# Patient Record
Sex: FEMALE | Race: WHITE | Employment: OTHER | URBAN - METROPOLITAN AREA
[De-identification: names, ages, dates, MRNs, and addresses within clinical notes are randomized per-mention and may not be internally consistent; named-entity substitution may affect disease eponyms.]

---

## 2017-01-01 ENCOUNTER — APPOINTMENT (OUTPATIENT)
Dept: GENERAL RADIOLOGY | Facility: HOSPITAL | Age: 82
End: 2017-01-01
Attending: EMERGENCY MEDICINE
Payer: MEDICARE

## 2017-01-01 ENCOUNTER — HOSPITAL ENCOUNTER (EMERGENCY)
Facility: HOSPITAL | Age: 82
Discharge: HOME | End: 2017-01-01
Attending: EMERGENCY MEDICINE
Payer: MEDICARE

## 2017-01-01 ENCOUNTER — APPOINTMENT (OUTPATIENT)
Dept: GENERAL RADIOLOGY | Facility: HOSPITAL | Age: 82
DRG: 280 | End: 2017-01-01
Attending: EMERGENCY MEDICINE
Payer: MEDICARE

## 2017-01-01 ENCOUNTER — APPOINTMENT (OUTPATIENT)
Dept: NUCLEAR MEDICINE | Facility: HOSPITAL | Age: 82
End: 2017-01-01
Attending: EMERGENCY MEDICINE
Payer: MEDICARE

## 2017-01-01 ENCOUNTER — HOSPITAL ENCOUNTER (INPATIENT)
Facility: HOSPITAL | Age: 82
LOS: 6 days | Discharge: HOME HEALTH | DRG: 291 | End: 2017-01-01
Attending: EMERGENCY MEDICINE | Admitting: HOSPITALIST
Payer: MEDICARE

## 2017-01-01 ENCOUNTER — HOSPITAL ENCOUNTER (INPATIENT)
Facility: HOSPITAL | Age: 82
LOS: 1 days | DRG: 283 | End: 2017-01-01
Attending: HOSPITALIST | Admitting: HOSPITALIST
Payer: OTHER MISCELLANEOUS

## 2017-01-01 ENCOUNTER — APPOINTMENT (OUTPATIENT)
Dept: CT IMAGING | Facility: HOSPITAL | Age: 82
DRG: 683 | End: 2017-01-01
Attending: HOSPITALIST
Payer: MEDICARE

## 2017-01-01 ENCOUNTER — APPOINTMENT (OUTPATIENT)
Dept: GENERAL RADIOLOGY | Facility: HOSPITAL | Age: 82
DRG: 280 | End: 2017-01-01
Attending: INTERNAL MEDICINE
Payer: MEDICARE

## 2017-01-01 ENCOUNTER — HOSPITAL ENCOUNTER (INPATIENT)
Facility: HOSPITAL | Age: 82
LOS: 4 days | Discharge: HOME HEALTH | DRG: 683 | End: 2017-01-01
Attending: EMERGENCY MEDICINE | Admitting: INTERNAL MEDICINE
Payer: MEDICARE

## 2017-01-01 ENCOUNTER — APPOINTMENT (OUTPATIENT)
Dept: GENERAL RADIOLOGY | Facility: HOSPITAL | Age: 82
DRG: 291 | End: 2017-01-01
Attending: EMERGENCY MEDICINE
Payer: MEDICARE

## 2017-01-01 ENCOUNTER — APPOINTMENT (OUTPATIENT)
Dept: CT IMAGING | Facility: HOSPITAL | Age: 82
DRG: 280 | End: 2017-01-01
Attending: INTERNAL MEDICINE
Payer: MEDICARE

## 2017-01-01 ENCOUNTER — HOSPITAL ENCOUNTER (INPATIENT)
Facility: HOSPITAL | Age: 82
LOS: 13 days | Discharge: HOSPICE | DRG: 280 | End: 2017-01-01
Attending: EMERGENCY MEDICINE | Admitting: HOSPITALIST
Payer: MEDICARE

## 2017-01-01 ENCOUNTER — APPOINTMENT (OUTPATIENT)
Dept: GENERAL RADIOLOGY | Facility: HOSPITAL | Age: 82
DRG: 291 | End: 2017-01-01
Attending: INTERNAL MEDICINE
Payer: MEDICARE

## 2017-01-01 ENCOUNTER — APPOINTMENT (OUTPATIENT)
Dept: GENERAL RADIOLOGY | Facility: HOSPITAL | Age: 82
DRG: 291 | End: 2017-01-01
Attending: RADIOLOGY
Payer: MEDICARE

## 2017-01-01 ENCOUNTER — APPOINTMENT (OUTPATIENT)
Dept: GENERAL RADIOLOGY | Facility: HOSPITAL | Age: 82
DRG: 683 | End: 2017-01-01
Attending: EMERGENCY MEDICINE
Payer: MEDICARE

## 2017-01-01 ENCOUNTER — APPOINTMENT (OUTPATIENT)
Dept: ULTRASOUND IMAGING | Facility: HOSPITAL | Age: 82
DRG: 291 | End: 2017-01-01
Attending: INTERNAL MEDICINE
Payer: MEDICARE

## 2017-01-01 VITALS
TEMPERATURE: 98 F | HEART RATE: 47 BPM | DIASTOLIC BLOOD PRESSURE: 34 MMHG | SYSTOLIC BLOOD PRESSURE: 98 MMHG | WEIGHT: 110.88 LBS | RESPIRATION RATE: 18 BRPM | OXYGEN SATURATION: 100 % | BODY MASS INDEX: 22 KG/M2

## 2017-01-01 VITALS
WEIGHT: 108 LBS | OXYGEN SATURATION: 97 % | HEART RATE: 32 BPM | BODY MASS INDEX: 21.2 KG/M2 | HEIGHT: 60 IN | RESPIRATION RATE: 18 BRPM | TEMPERATURE: 98 F | SYSTOLIC BLOOD PRESSURE: 106 MMHG | DIASTOLIC BLOOD PRESSURE: 47 MMHG

## 2017-01-01 VITALS
BODY MASS INDEX: 21.6 KG/M2 | HEIGHT: 60 IN | HEART RATE: 44 BPM | WEIGHT: 110 LBS | SYSTOLIC BLOOD PRESSURE: 115 MMHG | TEMPERATURE: 98 F | DIASTOLIC BLOOD PRESSURE: 44 MMHG | RESPIRATION RATE: 23 BRPM | OXYGEN SATURATION: 99 %

## 2017-01-01 VITALS
HEART RATE: 99 BPM | BODY MASS INDEX: 23.94 KG/M2 | RESPIRATION RATE: 18 BRPM | OXYGEN SATURATION: 99 % | HEIGHT: 60 IN | WEIGHT: 121.94 LBS | DIASTOLIC BLOOD PRESSURE: 61 MMHG | TEMPERATURE: 98 F | SYSTOLIC BLOOD PRESSURE: 142 MMHG

## 2017-01-01 VITALS
TEMPERATURE: 97 F | SYSTOLIC BLOOD PRESSURE: 126 MMHG | RESPIRATION RATE: 20 BRPM | HEART RATE: 35 BPM | DIASTOLIC BLOOD PRESSURE: 59 MMHG

## 2017-01-01 DIAGNOSIS — R06.00 DYSPNEA, UNSPECIFIED TYPE: Primary | ICD-10-CM

## 2017-01-01 DIAGNOSIS — N19 RENAL FAILURE: ICD-10-CM

## 2017-01-01 DIAGNOSIS — M54.12 CERVICAL RADICULOPATHY: Primary | ICD-10-CM

## 2017-01-01 DIAGNOSIS — R06.02 SHORTNESS OF BREATH: ICD-10-CM

## 2017-01-01 DIAGNOSIS — R77.8 ELEVATED TROPONIN: ICD-10-CM

## 2017-01-01 DIAGNOSIS — E87.1 HYPONATREMIA: ICD-10-CM

## 2017-01-01 DIAGNOSIS — R00.2 PALPITATIONS: Primary | ICD-10-CM

## 2017-01-01 DIAGNOSIS — R07.9 ACUTE CHEST PAIN: Primary | ICD-10-CM

## 2017-01-01 DIAGNOSIS — J90 BILATERAL PLEURAL EFFUSION: ICD-10-CM

## 2017-01-01 LAB
ALBUMIN SERPL-MCNC: 2.7 G/DL (ref 3.5–4.8)
ALBUMIN SERPL-MCNC: 2.7 G/DL (ref 3.5–4.8)
ALBUMIN SERPL-MCNC: 3 G/DL (ref 3.5–4.8)
ALBUMIN SERPL-MCNC: 3.4 G/DL (ref 3.5–4.8)
ALP LIVER SERPL-CCNC: 133 U/L (ref 55–142)
ALP LIVER SERPL-CCNC: 138 U/L (ref 55–142)
ALP LIVER SERPL-CCNC: 155 U/L (ref 55–142)
ALP LIVER SERPL-CCNC: 163 U/L (ref 55–142)
ALT SERPL-CCNC: 18 U/L (ref 14–54)
ALT SERPL-CCNC: 24 U/L (ref 14–54)
ALT SERPL-CCNC: 26 U/L (ref 14–54)
ALT SERPL-CCNC: 28 U/L (ref 14–54)
APTT PPP: 136.2 SECONDS (ref 25–34)
APTT PPP: 28 SECONDS (ref 25–34)
APTT PPP: 33.3 SECONDS (ref 25–34)
APTT PPP: 34.7 SECONDS (ref 25–34)
APTT PPP: 42.8 SECONDS (ref 25–34)
APTT PPP: 68.8 SECONDS (ref 25–34)
APTT PPP: 69 SECONDS (ref 25–34)
APTT PPP: 69.4 SECONDS (ref 25–34)
AST SERPL-CCNC: 25 U/L (ref 15–41)
AST SERPL-CCNC: 26 U/L (ref 15–41)
AST SERPL-CCNC: 30 U/L (ref 15–41)
AST SERPL-CCNC: 32 U/L (ref 15–41)
ATRIAL RATE: 300 BPM
ATRIAL RATE: 32 BPM
ATRIAL RATE: 42 BPM
ATRIAL RATE: 47 BPM
ATRIAL RATE: 56 BPM
ATRIAL RATE: 59 BPM
ATRIAL RATE: 71 BPM
ATRIAL RATE: 88 BPM
BAND %: 2 %
BASOPHIL % MANUAL: 0 %
BASOPHIL % MANUAL: 0 %
BASOPHIL ABSOLUTE MANUAL: 0 X10(3) UL (ref 0–0.1)
BASOPHIL ABSOLUTE MANUAL: 0 X10(3) UL (ref 0–0.1)
BASOPHILS # BLD AUTO: 0.04 X10(3) UL (ref 0–0.1)
BASOPHILS # BLD AUTO: 0.05 X10(3) UL (ref 0–0.1)
BASOPHILS # BLD AUTO: 0.06 X10(3) UL (ref 0–0.1)
BASOPHILS # BLD AUTO: 0.06 X10(3) UL (ref 0–0.1)
BASOPHILS # BLD AUTO: 0.07 X10(3) UL (ref 0–0.1)
BASOPHILS # BLD AUTO: 0.08 X10(3) UL (ref 0–0.1)
BASOPHILS # BLD AUTO: 0.08 X10(3) UL (ref 0–0.1)
BASOPHILS # BLD AUTO: 0.11 X10(3) UL (ref 0–0.1)
BASOPHILS NFR BLD AUTO: 0.4 %
BASOPHILS NFR BLD AUTO: 0.4 %
BASOPHILS NFR BLD AUTO: 0.5 %
BASOPHILS NFR BLD AUTO: 0.5 %
BASOPHILS NFR BLD AUTO: 0.6 %
BASOPHILS NFR BLD AUTO: 0.7 %
BASOPHILS NFR BLD AUTO: 0.7 %
BASOPHILS NFR BLD AUTO: 1.1 %
BILIRUB SERPL-MCNC: 0.2 MG/DL (ref 0.1–2)
BILIRUB SERPL-MCNC: 0.3 MG/DL (ref 0.1–2)
BILIRUB UR QL STRIP.AUTO: NEGATIVE
BILIRUB UR QL STRIP.AUTO: NEGATIVE
BUN BLD-MCNC: 101 MG/DL (ref 8–20)
BUN BLD-MCNC: 24 MG/DL (ref 8–20)
BUN BLD-MCNC: 26 MG/DL (ref 8–20)
BUN BLD-MCNC: 27 MG/DL (ref 8–20)
BUN BLD-MCNC: 34 MG/DL (ref 8–20)
BUN BLD-MCNC: 37 MG/DL (ref 8–20)
BUN BLD-MCNC: 42 MG/DL (ref 8–20)
BUN BLD-MCNC: 46 MG/DL (ref 8–20)
BUN BLD-MCNC: 46 MG/DL (ref 8–20)
BUN BLD-MCNC: 48 MG/DL (ref 8–20)
BUN BLD-MCNC: 50 MG/DL (ref 8–20)
BUN BLD-MCNC: 50 MG/DL (ref 8–20)
BUN BLD-MCNC: 51 MG/DL (ref 8–20)
BUN BLD-MCNC: 51 MG/DL (ref 8–20)
BUN BLD-MCNC: 52 MG/DL (ref 8–20)
BUN BLD-MCNC: 57 MG/DL (ref 8–20)
BUN BLD-MCNC: 58 MG/DL (ref 8–20)
BUN BLD-MCNC: 63 MG/DL (ref 8–20)
BUN BLD-MCNC: 77 MG/DL (ref 8–20)
BUN BLD-MCNC: 81 MG/DL (ref 8–20)
BUN BLD-MCNC: 89 MG/DL (ref 8–20)
BUN BLD-MCNC: 96 MG/DL (ref 8–20)
CALCIUM BLD-MCNC: 8 MG/DL (ref 8.3–10.3)
CALCIUM BLD-MCNC: 8.1 MG/DL (ref 8.3–10.3)
CALCIUM BLD-MCNC: 8.2 MG/DL (ref 8.3–10.3)
CALCIUM BLD-MCNC: 8.4 MG/DL (ref 8.3–10.3)
CALCIUM BLD-MCNC: 8.5 MG/DL (ref 8.3–10.3)
CALCIUM BLD-MCNC: 8.6 MG/DL (ref 8.3–10.3)
CALCIUM BLD-MCNC: 8.6 MG/DL (ref 8.3–10.3)
CALCIUM BLD-MCNC: 8.7 MG/DL (ref 8.3–10.3)
CALCIUM BLD-MCNC: 8.8 MG/DL (ref 8.3–10.3)
CALCIUM BLD-MCNC: 8.9 MG/DL (ref 8.3–10.3)
CALCIUM BLD-MCNC: 8.9 MG/DL (ref 8.3–10.3)
CALCIUM BLD-MCNC: 9 MG/DL (ref 8.3–10.3)
CALCIUM BLD-MCNC: 9.1 MG/DL (ref 8.3–10.3)
CALCIUM BLD-MCNC: 9.1 MG/DL (ref 8.3–10.3)
CALCIUM BLD-MCNC: 9.3 MG/DL (ref 8.3–10.3)
CALCIUM BLD-MCNC: 9.3 MG/DL (ref 8.3–10.3)
CALCIUM BLD-MCNC: 9.8 MG/DL (ref 8.3–10.3)
CHLORIDE: 100 MMOL/L (ref 101–111)
CHLORIDE: 100 MMOL/L (ref 101–111)
CHLORIDE: 101 MMOL/L (ref 101–111)
CHLORIDE: 101 MMOL/L (ref 101–111)
CHLORIDE: 102 MMOL/L (ref 101–111)
CHLORIDE: 109 MMOL/L (ref 101–111)
CHLORIDE: 113 MMOL/L (ref 101–111)
CHLORIDE: 86 MMOL/L (ref 101–111)
CHLORIDE: 87 MMOL/L (ref 101–111)
CHLORIDE: 88 MMOL/L (ref 101–111)
CHLORIDE: 89 MMOL/L (ref 101–111)
CHLORIDE: 91 MMOL/L (ref 101–111)
CHLORIDE: 93 MMOL/L (ref 101–111)
CHLORIDE: 94 MMOL/L (ref 101–111)
CHLORIDE: 95 MMOL/L (ref 101–111)
CHLORIDE: 95 MMOL/L (ref 101–111)
CHLORIDE: 96 MMOL/L (ref 101–111)
CHLORIDE: 98 MMOL/L (ref 101–111)
CHLORIDE: 98 MMOL/L (ref 101–111)
CHLORIDE: 99 MMOL/L (ref 101–111)
CK: 36 IU/L (ref 26–192)
CK: 37 IU/L (ref 26–192)
CK: 39 IU/L (ref 26–192)
CLARITY UR REFRACT.AUTO: CLEAR
CO2: 18 MMOL/L (ref 22–32)
CO2: 19 MMOL/L (ref 22–32)
CO2: 19 MMOL/L (ref 22–32)
CO2: 20 MMOL/L (ref 22–32)
CO2: 23 MMOL/L (ref 22–32)
CO2: 25 MMOL/L (ref 22–32)
CO2: 25 MMOL/L (ref 22–32)
CO2: 26 MMOL/L (ref 22–32)
CO2: 27 MMOL/L (ref 22–32)
CO2: 28 MMOL/L (ref 22–32)
CO2: 29 MMOL/L (ref 22–32)
CO2: 30 MMOL/L (ref 22–32)
CO2: 30 MMOL/L (ref 22–32)
CO2: 31 MMOL/L (ref 22–32)
COLOR UR AUTO: YELLOW
CREAT BLD-MCNC: 1.27 MG/DL (ref 0.55–1.02)
CREAT BLD-MCNC: 1.41 MG/DL (ref 0.55–1.02)
CREAT BLD-MCNC: 1.41 MG/DL (ref 0.55–1.02)
CREAT BLD-MCNC: 1.45 MG/DL (ref 0.55–1.02)
CREAT BLD-MCNC: 1.45 MG/DL (ref 0.55–1.02)
CREAT BLD-MCNC: 1.46 MG/DL (ref 0.55–1.02)
CREAT BLD-MCNC: 1.48 MG/DL (ref 0.55–1.02)
CREAT BLD-MCNC: 1.53 MG/DL (ref 0.55–1.02)
CREAT BLD-MCNC: 1.54 MG/DL (ref 0.55–1.02)
CREAT BLD-MCNC: 1.57 MG/DL (ref 0.55–1.02)
CREAT BLD-MCNC: 1.65 MG/DL (ref 0.55–1.02)
CREAT BLD-MCNC: 1.72 MG/DL (ref 0.55–1.02)
CREAT BLD-MCNC: 1.79 MG/DL (ref 0.55–1.02)
CREAT BLD-MCNC: 1.93 MG/DL (ref 0.55–1.02)
CREAT BLD-MCNC: 2.91 MG/DL (ref 0.55–1.02)
CREAT BLD-MCNC: 3.09 MG/DL (ref 0.55–1.02)
CREAT BLD-MCNC: 3.13 MG/DL (ref 0.55–1.02)
CREAT BLD-MCNC: 3.36 MG/DL (ref 0.55–1.02)
CREAT BLD-MCNC: 4.55 MG/DL (ref 0.55–1.02)
CREAT BLD-MCNC: 5.15 MG/DL (ref 0.55–1.02)
CREAT UR-SCNC: 61.4 MG/DL
CREAT UR-SCNC: 74.6 MG/DL
DEPRECATED HBV CORE AB SER IA-ACNC: 566.7 NG/ML (ref 10–291)
EOSINOPHIL # BLD AUTO: 0.1 X10(3) UL (ref 0–0.3)
EOSINOPHIL # BLD AUTO: 0.15 X10(3) UL (ref 0–0.3)
EOSINOPHIL # BLD AUTO: 0.2 X10(3) UL (ref 0–0.3)
EOSINOPHIL # BLD AUTO: 0.23 X10(3) UL (ref 0–0.3)
EOSINOPHIL # BLD AUTO: 0.23 X10(3) UL (ref 0–0.3)
EOSINOPHIL # BLD AUTO: 0.28 X10(3) UL (ref 0–0.3)
EOSINOPHIL # BLD AUTO: 0.28 X10(3) UL (ref 0–0.3)
EOSINOPHIL # BLD AUTO: 0.31 X10(3) UL (ref 0–0.3)
EOSINOPHIL # BLD AUTO: 0.31 X10(3) UL (ref 0–0.3)
EOSINOPHIL # BLD AUTO: 0.33 X10(3) UL (ref 0–0.3)
EOSINOPHIL # BLD AUTO: 0.35 X10(3) UL (ref 0–0.3)
EOSINOPHIL % MANUAL: 1 %
EOSINOPHIL % MANUAL: 1 %
EOSINOPHIL ABSOLUTE MANUAL: 0.14 X10(3) UL (ref 0–0.3)
EOSINOPHIL ABSOLUTE MANUAL: 0.19 X10(3) UL (ref 0–0.3)
EOSINOPHIL NFR BLD AUTO: 1 %
EOSINOPHIL NFR BLD AUTO: 1.4 %
EOSINOPHIL NFR BLD AUTO: 1.8 %
EOSINOPHIL NFR BLD AUTO: 2.1 %
EOSINOPHIL NFR BLD AUTO: 2.3 %
EOSINOPHIL NFR BLD AUTO: 2.3 %
EOSINOPHIL NFR BLD AUTO: 2.4 %
EOSINOPHIL NFR BLD AUTO: 2.5 %
EOSINOPHIL NFR BLD AUTO: 2.5 %
EOSINOPHIL NFR BLD AUTO: 2.8 %
EOSINOPHIL NFR BLD AUTO: 3.1 %
ERYTHROCYTE [DISTWIDTH] IN BLOOD BY AUTOMATED COUNT: 12.9 % (ref 11.5–16)
ERYTHROCYTE [DISTWIDTH] IN BLOOD BY AUTOMATED COUNT: 13 % (ref 11.5–16)
ERYTHROCYTE [DISTWIDTH] IN BLOOD BY AUTOMATED COUNT: 13 % (ref 11.5–16)
ERYTHROCYTE [DISTWIDTH] IN BLOOD BY AUTOMATED COUNT: 13.1 % (ref 11.5–16)
ERYTHROCYTE [DISTWIDTH] IN BLOOD BY AUTOMATED COUNT: 13.2 % (ref 11.5–16)
ERYTHROCYTE [DISTWIDTH] IN BLOOD BY AUTOMATED COUNT: 13.4 % (ref 11.5–16)
ERYTHROCYTE [DISTWIDTH] IN BLOOD BY AUTOMATED COUNT: 13.5 % (ref 11.5–16)
ERYTHROCYTE [DISTWIDTH] IN BLOOD BY AUTOMATED COUNT: 13.8 % (ref 11.5–16)
ERYTHROCYTE [DISTWIDTH] IN BLOOD BY AUTOMATED COUNT: 13.8 % (ref 11.5–16)
ERYTHROCYTE [DISTWIDTH] IN BLOOD BY AUTOMATED COUNT: 13.9 % (ref 11.5–16)
ERYTHROCYTE [DISTWIDTH] IN BLOOD BY AUTOMATED COUNT: 13.9 % (ref 11.5–16)
ERYTHROCYTE [DISTWIDTH] IN BLOOD BY AUTOMATED COUNT: 14.1 % (ref 11.5–16)
ERYTHROCYTE [DISTWIDTH] IN BLOOD BY AUTOMATED COUNT: 14.3 % (ref 11.5–16)
GLUCOSE BLD-MCNC: 100 MG/DL (ref 70–99)
GLUCOSE BLD-MCNC: 103 MG/DL (ref 70–99)
GLUCOSE BLD-MCNC: 105 MG/DL (ref 70–99)
GLUCOSE BLD-MCNC: 108 MG/DL (ref 70–99)
GLUCOSE BLD-MCNC: 109 MG/DL (ref 70–99)
GLUCOSE BLD-MCNC: 110 MG/DL (ref 70–99)
GLUCOSE BLD-MCNC: 110 MG/DL (ref 70–99)
GLUCOSE BLD-MCNC: 111 MG/DL (ref 70–99)
GLUCOSE BLD-MCNC: 112 MG/DL (ref 70–99)
GLUCOSE BLD-MCNC: 113 MG/DL (ref 70–99)
GLUCOSE BLD-MCNC: 114 MG/DL (ref 70–99)
GLUCOSE BLD-MCNC: 115 MG/DL (ref 65–99)
GLUCOSE BLD-MCNC: 115 MG/DL (ref 70–99)
GLUCOSE BLD-MCNC: 116 MG/DL (ref 70–99)
GLUCOSE BLD-MCNC: 119 MG/DL (ref 70–99)
GLUCOSE BLD-MCNC: 120 MG/DL (ref 65–99)
GLUCOSE BLD-MCNC: 122 MG/DL (ref 70–99)
GLUCOSE BLD-MCNC: 124 MG/DL (ref 70–99)
GLUCOSE BLD-MCNC: 152 MG/DL (ref 70–99)
GLUCOSE BLD-MCNC: 154 MG/DL (ref 70–99)
GLUCOSE BLD-MCNC: 159 MG/DL (ref 70–99)
GLUCOSE BLD-MCNC: 186 MG/DL (ref 65–99)
GLUCOSE BLD-MCNC: 193 MG/DL (ref 65–99)
GLUCOSE BLD-MCNC: 92 MG/DL (ref 70–99)
GLUCOSE BLD-MCNC: 98 MG/DL (ref 70–99)
GLUCOSE BLD-MCNC: 98 MG/DL (ref 70–99)
GLUCOSE PLEURAL FLUID: 126 MG/DL
GLUCOSE UR STRIP.AUTO-MCNC: NEGATIVE MG/DL
GLUCOSE UR STRIP.AUTO-MCNC: NEGATIVE MG/DL
HAV IGM SER QL: 1.5 MG/DL (ref 1.7–3)
HAV IGM SER QL: 1.6 MG/DL (ref 1.7–3)
HAV IGM SER QL: 1.7 MG/DL (ref 1.7–3)
HAV IGM SER QL: 1.8 MG/DL (ref 1.7–3)
HAV IGM SER QL: 2 MG/DL (ref 1.7–3)
HAV IGM SER QL: 2.1 MG/DL (ref 1.7–3)
HAV IGM SER QL: 2.3 MG/DL (ref 1.7–3)
HAV IGM SER QL: 2.4 MG/DL (ref 1.7–3)
HAV IGM SER QL: 3.2 MG/DL (ref 1.7–3)
HCT VFR BLD AUTO: 26.3 % (ref 34–50)
HCT VFR BLD AUTO: 26.8 % (ref 34–50)
HCT VFR BLD AUTO: 26.9 % (ref 34–50)
HCT VFR BLD AUTO: 27.2 % (ref 34–50)
HCT VFR BLD AUTO: 27.3 % (ref 34–50)
HCT VFR BLD AUTO: 27.6 % (ref 34–50)
HCT VFR BLD AUTO: 27.9 % (ref 34–50)
HCT VFR BLD AUTO: 28.2 % (ref 34–50)
HCT VFR BLD AUTO: 28.5 % (ref 34–50)
HCT VFR BLD AUTO: 28.8 % (ref 34–50)
HCT VFR BLD AUTO: 29 % (ref 34–50)
HCT VFR BLD AUTO: 29.1 % (ref 34–50)
HCT VFR BLD AUTO: 29.3 % (ref 34–50)
HCT VFR BLD AUTO: 30.4 % (ref 34–50)
HCT VFR BLD AUTO: 31.3 % (ref 34–50)
HCT VFR BLD AUTO: 32.1 % (ref 34–50)
HCT VFR BLD AUTO: 32.5 % (ref 34–50)
HGB BLD-MCNC: 10.1 G/DL (ref 12–16)
HGB BLD-MCNC: 10.2 G/DL (ref 12–16)
HGB BLD-MCNC: 10.6 G/DL (ref 12–16)
HGB BLD-MCNC: 10.7 G/DL (ref 12–16)
HGB BLD-MCNC: 8.5 G/DL (ref 12–16)
HGB BLD-MCNC: 8.7 G/DL (ref 12–16)
HGB BLD-MCNC: 8.7 G/DL (ref 12–16)
HGB BLD-MCNC: 8.9 G/DL (ref 12–16)
HGB BLD-MCNC: 9 G/DL (ref 12–16)
HGB BLD-MCNC: 9 G/DL (ref 12–16)
HGB BLD-MCNC: 9.2 G/DL (ref 12–16)
HGB BLD-MCNC: 9.5 G/DL (ref 12–16)
HGB BLD-MCNC: 9.6 G/DL (ref 12–16)
HGB BLD-MCNC: 9.7 G/DL (ref 12–16)
HGB BLD-MCNC: 9.8 G/DL (ref 12–16)
HGB BLD-MCNC: 9.8 G/DL (ref 12–16)
HYALINE CASTS #/AREA URNS AUTO: PRESENT /LPF
HYALINE CASTS #/AREA URNS AUTO: PRESENT /LPF
IMMATURE GRANULOCYTE COUNT: 0.09 X10(3) UL (ref 0–1)
IMMATURE GRANULOCYTE COUNT: 0.1 X10(3) UL (ref 0–1)
IMMATURE GRANULOCYTE COUNT: 0.11 X10(3) UL (ref 0–1)
IMMATURE GRANULOCYTE COUNT: 0.12 X10(3) UL (ref 0–1)
IMMATURE GRANULOCYTE COUNT: 0.14 X10(3) UL (ref 0–1)
IMMATURE GRANULOCYTE COUNT: 0.17 X10(3) UL (ref 0–1)
IMMATURE GRANULOCYTE COUNT: 0.26 X10(3) UL (ref 0–1)
IMMATURE GRANULOCYTE RATIO %: 0.8 %
IMMATURE GRANULOCYTE RATIO %: 0.9 %
IMMATURE GRANULOCYTE RATIO %: 1 %
IMMATURE GRANULOCYTE RATIO %: 1.1 %
IMMATURE GRANULOCYTE RATIO %: 1.4 %
IMMATURE GRANULOCYTE RATIO %: 1.4 %
IMMATURE GRANULOCYTE RATIO %: 1.5 %
IMMATURE GRANULOCYTE RATIO %: 1.7 %
IMMATURE GRANULOCYTE RATIO %: 1.9 %
IMMATURE GRANULOCYTE RATIO %: 2.1 %
IMMATURE GRANULOCYTE RATIO %: 2.2 %
INR BLD: 1.04 (ref 0.89–1.12)
INR BLD: 1.04 (ref 0.89–1.12)
INR BLD: 1.06 (ref 0.89–1.12)
INR BLD: 1.11 (ref 0.89–1.12)
INR BLD: 1.15 (ref 0.89–1.12)
IRON SATURATION: 31 % (ref 13–45)
IRON: 91 UG/DL (ref 28–170)
KETONES UR STRIP.AUTO-MCNC: NEGATIVE MG/DL
KETONES UR STRIP.AUTO-MCNC: NEGATIVE MG/DL
LDH PLEURAL FLUID: 77 U/L
LYMPHOCYTE % MANUAL: 12 %
LYMPHOCYTE % MANUAL: 16 %
LYMPHOCYTE ABSOLUTE MANUAL: 2.21 X10(3) UL (ref 0.9–4)
LYMPHOCYTE ABSOLUTE MANUAL: 2.32 X10(3) UL (ref 0.9–4)
LYMPHOCYTES # BLD AUTO: 1.86 X10(3) UL (ref 0.9–4)
LYMPHOCYTES # BLD AUTO: 2.52 X10(3) UL (ref 0.9–4)
LYMPHOCYTES # BLD AUTO: 2.79 X10(3) UL (ref 0.9–4)
LYMPHOCYTES # BLD AUTO: 2.85 X10(3) UL (ref 0.9–4)
LYMPHOCYTES # BLD AUTO: 3.23 X10(3) UL (ref 0.9–4)
LYMPHOCYTES # BLD AUTO: 3.37 X10(3) UL (ref 0.9–4)
LYMPHOCYTES # BLD AUTO: 3.45 X10(3) UL (ref 0.9–4)
LYMPHOCYTES # BLD AUTO: 3.78 X10(3) UL (ref 0.9–4)
LYMPHOCYTES # BLD AUTO: 3.95 X10(3) UL (ref 0.9–4)
LYMPHOCYTES # BLD AUTO: 4.14 X10(3) UL (ref 0.9–4)
LYMPHOCYTES # BLD AUTO: 4.96 X10(3) UL (ref 0.9–4)
LYMPHOCYTES NFR BLD AUTO: 18.7 %
LYMPHOCYTES NFR BLD AUTO: 22.9 %
LYMPHOCYTES NFR BLD AUTO: 25 %
LYMPHOCYTES NFR BLD AUTO: 27.3 %
LYMPHOCYTES NFR BLD AUTO: 27.7 %
LYMPHOCYTES NFR BLD AUTO: 27.9 %
LYMPHOCYTES NFR BLD AUTO: 28 %
LYMPHOCYTES NFR BLD AUTO: 28.8 %
LYMPHOCYTES NFR BLD AUTO: 33 %
LYMPHOCYTES NFR BLD AUTO: 39.1 %
LYMPHOCYTES NFR BLD AUTO: 43.9 %
M PROTEIN MFR SERPL ELPH: 7.4 G/DL (ref 6.1–8.3)
M PROTEIN MFR SERPL ELPH: 7.5 G/DL (ref 6.1–8.3)
M PROTEIN MFR SERPL ELPH: 7.7 G/DL (ref 6.1–8.3)
M PROTEIN MFR SERPL ELPH: 7.8 G/DL (ref 6.1–8.3)
MCH RBC QN AUTO: 30.5 PG (ref 27–33.2)
MCH RBC QN AUTO: 30.5 PG (ref 27–33.2)
MCH RBC QN AUTO: 30.6 PG (ref 27–33.2)
MCH RBC QN AUTO: 30.7 PG (ref 27–33.2)
MCH RBC QN AUTO: 30.9 PG (ref 27–33.2)
MCH RBC QN AUTO: 31 PG (ref 27–33.2)
MCH RBC QN AUTO: 31.1 PG (ref 27–33.2)
MCH RBC QN AUTO: 31.1 PG (ref 27–33.2)
MCH RBC QN AUTO: 31.3 PG (ref 27–33.2)
MCH RBC QN AUTO: 31.4 PG (ref 27–33.2)
MCH RBC QN AUTO: 31.5 PG (ref 27–33.2)
MCH RBC QN AUTO: 31.5 PG (ref 27–33.2)
MCH RBC QN AUTO: 31.9 PG (ref 27–33.2)
MCHC RBC AUTO-ENTMCNC: 31.5 G/DL (ref 31–37)
MCHC RBC AUTO-ENTMCNC: 31.7 G/DL (ref 31–37)
MCHC RBC AUTO-ENTMCNC: 32.3 G/DL (ref 31–37)
MCHC RBC AUTO-ENTMCNC: 32.3 G/DL (ref 31–37)
MCHC RBC AUTO-ENTMCNC: 32.6 G/DL (ref 31–37)
MCHC RBC AUTO-ENTMCNC: 32.6 G/DL (ref 31–37)
MCHC RBC AUTO-ENTMCNC: 32.7 G/DL (ref 31–37)
MCHC RBC AUTO-ENTMCNC: 33 G/DL (ref 31–37)
MCHC RBC AUTO-ENTMCNC: 33 G/DL (ref 31–37)
MCHC RBC AUTO-ENTMCNC: 33.1 G/DL (ref 31–37)
MCHC RBC AUTO-ENTMCNC: 33.2 G/DL (ref 31–37)
MCHC RBC AUTO-ENTMCNC: 33.3 G/DL (ref 31–37)
MCHC RBC AUTO-ENTMCNC: 33.6 G/DL (ref 31–37)
MCHC RBC AUTO-ENTMCNC: 33.7 G/DL (ref 31–37)
MCHC RBC AUTO-ENTMCNC: 34.1 G/DL (ref 31–37)
MCV RBC AUTO: 92.1 FL (ref 81–100)
MCV RBC AUTO: 92.1 FL (ref 81–100)
MCV RBC AUTO: 93 FL (ref 81–100)
MCV RBC AUTO: 93.2 FL (ref 81–100)
MCV RBC AUTO: 93.3 FL (ref 81–100)
MCV RBC AUTO: 93.4 FL (ref 81–100)
MCV RBC AUTO: 93.6 FL (ref 81–100)
MCV RBC AUTO: 93.9 FL (ref 81–100)
MCV RBC AUTO: 94.4 FL (ref 81–100)
MCV RBC AUTO: 94.6 FL (ref 81–100)
MCV RBC AUTO: 94.7 FL (ref 81–100)
MCV RBC AUTO: 95 FL (ref 81–100)
MCV RBC AUTO: 95.1 FL (ref 81–100)
MCV RBC AUTO: 96 FL (ref 81–100)
MCV RBC AUTO: 96 FL (ref 81–100)
MCV RBC AUTO: 96.7 FL (ref 81–100)
MCV RBC AUTO: 97.2 FL (ref 81–100)
METAMYELOCYTE %: 4 %
METAMYELOCYTE ABSOLUTE MANUAL: 0.77 X10(3) UL (ref ?–0.01)
MONOCYTE % MANUAL: 13 %
MONOCYTE % MANUAL: 15 %
MONOCYTE ABSOLUTE MANUAL: 1.79 X10(3) UL (ref 0.1–0.6)
MONOCYTE ABSOLUTE MANUAL: 2.9 X10(3) UL (ref 0.1–0.6)
MONOCYTES # BLD AUTO: 1.13 X10(3) UL (ref 0.1–0.6)
MONOCYTES # BLD AUTO: 1.26 X10(3) UL (ref 0.1–0.6)
MONOCYTES # BLD AUTO: 1.4 X10(3) UL (ref 0.1–0.6)
MONOCYTES # BLD AUTO: 1.53 X10(3) UL (ref 0.1–0.6)
MONOCYTES # BLD AUTO: 1.72 X10(3) UL (ref 0.1–0.6)
MONOCYTES # BLD AUTO: 1.74 X10(3) UL (ref 0.1–0.6)
MONOCYTES # BLD AUTO: 1.75 X10(3) UL (ref 0.1–0.6)
MONOCYTES # BLD AUTO: 1.79 X10(3) UL (ref 0.1–0.6)
MONOCYTES # BLD AUTO: 1.83 X10(3) UL (ref 0.1–0.6)
MONOCYTES # BLD AUTO: 1.85 X10(3) UL (ref 0.1–0.6)
MONOCYTES # BLD AUTO: 1.91 X10(3) UL (ref 0.1–0.6)
MONOCYTES NFR BLD AUTO: 11.2 %
MONOCYTES NFR BLD AUTO: 11.3 %
MONOCYTES NFR BLD AUTO: 12.2 %
MONOCYTES NFR BLD AUTO: 13.6 %
MONOCYTES NFR BLD AUTO: 13.9 %
MONOCYTES NFR BLD AUTO: 14.2 %
MONOCYTES NFR BLD AUTO: 14.9 %
MONOCYTES NFR BLD AUTO: 15.5 %
MONOCYTES NFR BLD AUTO: 15.6 %
MONOCYTES NFR BLD AUTO: 16 %
MONOCYTES NFR BLD AUTO: 18.7 %
MORPHOLOGY: NORMAL
MYELOCYTE %: 3 %
MYELOCYTE ABSOLUTE MANUAL: 0.58 X10(3) UL (ref ?–0.01)
NEUTROPHIL ABS PRELIM: 11.34 X10 (3) UL (ref 1.3–6.7)
NEUTROPHIL ABS PRELIM: 4.26 X10 (3) UL (ref 1.3–6.7)
NEUTROPHIL ABS PRELIM: 4.57 X10 (3) UL (ref 1.3–6.7)
NEUTROPHIL ABS PRELIM: 4.99 X10 (3) UL (ref 1.3–6.7)
NEUTROPHIL ABS PRELIM: 5.93 X10 (3) UL (ref 1.3–6.7)
NEUTROPHIL ABS PRELIM: 6.21 X10 (3) UL (ref 1.3–6.7)
NEUTROPHIL ABS PRELIM: 6.22 X10 (3) UL (ref 1.3–6.7)
NEUTROPHIL ABS PRELIM: 6.37 X10 (3) UL (ref 1.3–6.7)
NEUTROPHIL ABS PRELIM: 6.42 X10 (3) UL (ref 1.3–6.7)
NEUTROPHIL ABS PRELIM: 6.46 X10 (3) UL (ref 1.3–6.7)
NEUTROPHIL ABS PRELIM: 6.73 X10 (3) UL (ref 1.3–6.7)
NEUTROPHIL ABS PRELIM: 7.4 X10 (3) UL (ref 1.3–6.7)
NEUTROPHIL ABS PRELIM: 8.95 X10 (3) UL (ref 1.3–6.7)
NEUTROPHIL ABSOLUTE MANUAL: 12.55 X10(3) UL (ref 1.3–6.7)
NEUTROPHIL ABSOLUTE MANUAL: 9.66 X10(3) UL (ref 1.3–6.7)
NEUTROPHILS # BLD AUTO: 4.26 X10(3) UL (ref 1.3–6.7)
NEUTROPHILS # BLD AUTO: 4.57 X10(3) UL (ref 1.3–6.7)
NEUTROPHILS # BLD AUTO: 4.99 X10(3) UL (ref 1.3–6.7)
NEUTROPHILS # BLD AUTO: 5.93 X10(3) UL (ref 1.3–6.7)
NEUTROPHILS # BLD AUTO: 6.21 X10(3) UL (ref 1.3–6.7)
NEUTROPHILS # BLD AUTO: 6.22 X10(3) UL (ref 1.3–6.7)
NEUTROPHILS # BLD AUTO: 6.37 X10(3) UL (ref 1.3–6.7)
NEUTROPHILS # BLD AUTO: 6.42 X10(3) UL (ref 1.3–6.7)
NEUTROPHILS # BLD AUTO: 6.46 X10(3) UL (ref 1.3–6.7)
NEUTROPHILS # BLD AUTO: 6.73 X10(3) UL (ref 1.3–6.7)
NEUTROPHILS # BLD AUTO: 7.4 X10(3) UL (ref 1.3–6.7)
NEUTROPHILS % MANUAL: 65 %
NEUTROPHILS % MANUAL: 68 %
NEUTROPHILS NFR BLD AUTO: 40.5 %
NEUTROPHILS NFR BLD AUTO: 42.2 %
NEUTROPHILS NFR BLD AUTO: 48.7 %
NEUTROPHILS NFR BLD AUTO: 50.8 %
NEUTROPHILS NFR BLD AUTO: 51 %
NEUTROPHILS NFR BLD AUTO: 52.6 %
NEUTROPHILS NFR BLD AUTO: 52.7 %
NEUTROPHILS NFR BLD AUTO: 54.1 %
NEUTROPHILS NFR BLD AUTO: 55.5 %
NEUTROPHILS NFR BLD AUTO: 58 %
NEUTROPHILS NFR BLD AUTO: 67.6 %
NITRITE UR QL STRIP.AUTO: NEGATIVE
NITRITE UR QL STRIP.AUTO: NEGATIVE
NRBC CALCULATED: 2
OSMOLALITY URINE: 318 MOSM/KG (ref 300–1300)
OSMOLALITY URINE: 345 MOSM/KG (ref 300–1300)
P AXIS: 43 DEGREES
P AXIS: 44 DEGREES
P AXIS: 72 DEGREES
P AXIS: 76 DEGREES
P-R INTERVAL: 156 MS
P-R INTERVAL: 164 MS
P-R INTERVAL: 182 MS
P-R INTERVAL: 268 MS
PH UR STRIP.AUTO: 5 [PH] (ref 4.5–8)
PH UR STRIP.AUTO: 6 [PH] (ref 4.5–8)
PLATELET # BLD AUTO: 205 10(3)UL (ref 150–450)
PLATELET # BLD AUTO: 219 10(3)UL (ref 150–450)
PLATELET # BLD AUTO: 225 10(3)UL (ref 150–450)
PLATELET # BLD AUTO: 241 10(3)UL (ref 150–450)
PLATELET # BLD AUTO: 326 10(3)UL (ref 150–450)
PLATELET # BLD AUTO: 327 10(3)UL (ref 150–450)
PLATELET # BLD AUTO: 332 10(3)UL (ref 150–450)
PLATELET # BLD AUTO: 336 10(3)UL (ref 150–450)
PLATELET # BLD AUTO: 349 10(3)UL (ref 150–450)
PLATELET # BLD AUTO: 381 10(3)UL (ref 150–450)
PLATELET # BLD AUTO: 409 10(3)UL (ref 150–450)
PLATELET # BLD AUTO: 421 10(3)UL (ref 150–450)
PLATELET # BLD AUTO: 431 10(3)UL (ref 150–450)
PLATELET # BLD AUTO: 434 10(3)UL (ref 150–450)
PLATELET # BLD AUTO: 443 10(3)UL (ref 150–450)
PLATELET # BLD AUTO: 483 10(3)UL (ref 150–450)
PLATELET # BLD AUTO: 491 10(3)UL (ref 150–450)
PLATELET # BLD AUTO: 511 10(3)UL (ref 150–450)
PLATELET MORPHOLOGY: NORMAL
PLATELET MORPHOLOGY: NORMAL
POTASSIUM SERPL-SCNC: 3.3 MMOL/L (ref 3.6–5.1)
POTASSIUM SERPL-SCNC: 3.7 MMOL/L (ref 3.6–5.1)
POTASSIUM SERPL-SCNC: 3.8 MMOL/L (ref 3.6–5.1)
POTASSIUM SERPL-SCNC: 3.9 MMOL/L (ref 3.6–5.1)
POTASSIUM SERPL-SCNC: 4 MMOL/L (ref 3.6–5.1)
POTASSIUM SERPL-SCNC: 4.1 MMOL/L (ref 3.6–5.1)
POTASSIUM SERPL-SCNC: 4.2 MMOL/L (ref 3.6–5.1)
POTASSIUM SERPL-SCNC: 4.3 MMOL/L (ref 3.6–5.1)
POTASSIUM SERPL-SCNC: 4.4 MMOL/L (ref 3.6–5.1)
POTASSIUM SERPL-SCNC: 4.4 MMOL/L (ref 3.6–5.1)
POTASSIUM SERPL-SCNC: 4.6 MMOL/L (ref 3.6–5.1)
POTASSIUM SERPL-SCNC: 4.6 MMOL/L (ref 3.6–5.1)
POTASSIUM SERPL-SCNC: 4.7 MMOL/L (ref 3.6–5.1)
POTASSIUM SERPL-SCNC: 4.7 MMOL/L (ref 3.6–5.1)
POTASSIUM SERPL-SCNC: 4.8 MMOL/L (ref 3.6–5.1)
POTASSIUM SERPL-SCNC: 5.1 MMOL/L (ref 3.6–5.1)
POTASSIUM SERPL-SCNC: 5.2 MMOL/L (ref 3.6–5.1)
POTASSIUM SERPL-SCNC: 5.5 MMOL/L (ref 3.6–5.1)
PRO-BETA NATRIURETIC PEPTIDE: 2767 PG/ML (ref ?–450)
PRO-BETA NATRIURETIC PEPTIDE: 6257 PG/ML (ref ?–450)
PROCALCITONIN SERPL-MCNC: 1.09 NG/ML (ref ?–0.11)
PROT UR STRIP.AUTO-MCNC: NEGATIVE MG/DL
PROT UR STRIP.AUTO-MCNC: NEGATIVE MG/DL
PSA SERPL DL<=0.01 NG/ML-MCNC: 13.9 SECONDS (ref 12.3–14.8)
PSA SERPL DL<=0.01 NG/ML-MCNC: 13.9 SECONDS (ref 12.3–14.8)
PSA SERPL DL<=0.01 NG/ML-MCNC: 14.1 SECONDS (ref 12.3–14.8)
PSA SERPL DL<=0.01 NG/ML-MCNC: 14.7 SECONDS (ref 12.3–14.8)
PSA SERPL DL<=0.01 NG/ML-MCNC: 15.1 SECONDS (ref 12.3–14.8)
Q-T INTERVAL: 346 MS
Q-T INTERVAL: 426 MS
Q-T INTERVAL: 460 MS
Q-T INTERVAL: 470 MS
Q-T INTERVAL: 472 MS
Q-T INTERVAL: 472 MS
Q-T INTERVAL: 516 MS
Q-T INTERVAL: 672 MS
QRS DURATION: 114 MS
QRS DURATION: 118 MS
QRS DURATION: 120 MS
QRS DURATION: 122 MS
QRS DURATION: 124 MS
QRS DURATION: 128 MS
QRS DURATION: 130 MS
QRS DURATION: 130 MS
QTC CALCULATION (BEZET): 411 MS
QTC CALCULATION (BEZET): 443 MS
QTC CALCULATION (BEZET): 456 MS
QTC CALCULATION (BEZET): 462 MS
QTC CALCULATION (BEZET): 467 MS
QTC CALCULATION (BEZET): 475 MS
QTC CALCULATION (BEZET): 480 MS
QTC CALCULATION (BEZET): 490 MS
R AXIS: -48 DEGREES
R AXIS: -50 DEGREES
R AXIS: -51 DEGREES
R AXIS: -53 DEGREES
R AXIS: -54 DEGREES
R AXIS: -55 DEGREES
R AXIS: -57 DEGREES
R AXIS: -57 DEGREES
RBC # BLD AUTO: 2.72 X10(6)UL (ref 3.8–5.1)
RBC # BLD AUTO: 2.84 X10(6)UL (ref 3.8–5.1)
RBC # BLD AUTO: 2.85 X10(6)UL (ref 3.8–5.1)
RBC # BLD AUTO: 2.86 X10(6)UL (ref 3.8–5.1)
RBC # BLD AUTO: 2.91 X10(6)UL (ref 3.8–5.1)
RBC # BLD AUTO: 2.93 X10(6)UL (ref 3.8–5.1)
RBC # BLD AUTO: 2.98 X10(6)UL (ref 3.8–5.1)
RBC # BLD AUTO: 3.02 X10(6)UL (ref 3.8–5.1)
RBC # BLD AUTO: 3.03 X10(6)UL (ref 3.8–5.1)
RBC # BLD AUTO: 3.04 X10(6)UL (ref 3.8–5.1)
RBC # BLD AUTO: 3.05 X10(6)UL (ref 3.8–5.1)
RBC # BLD AUTO: 3.11 X10(6)UL (ref 3.8–5.1)
RBC # BLD AUTO: 3.12 X10(6)UL (ref 3.8–5.1)
RBC # BLD AUTO: 3.26 X10(6)UL (ref 3.8–5.1)
RBC # BLD AUTO: 3.26 X10(6)UL (ref 3.8–5.1)
RBC # BLD AUTO: 3.42 X10(6)UL (ref 3.8–5.1)
RBC # BLD AUTO: 3.45 X10(6)UL (ref 3.8–5.1)
RBC UR QL AUTO: NEGATIVE
RBC UR QL AUTO: NEGATIVE
RED CELL DISTRIBUTION WIDTH-SD: 43.2 FL (ref 35.1–46.3)
RED CELL DISTRIBUTION WIDTH-SD: 43.3 FL (ref 35.1–46.3)
RED CELL DISTRIBUTION WIDTH-SD: 44.1 FL (ref 35.1–46.3)
RED CELL DISTRIBUTION WIDTH-SD: 44.3 FL (ref 35.1–46.3)
RED CELL DISTRIBUTION WIDTH-SD: 44.4 FL (ref 35.1–46.3)
RED CELL DISTRIBUTION WIDTH-SD: 44.9 FL (ref 35.1–46.3)
RED CELL DISTRIBUTION WIDTH-SD: 45.3 FL (ref 35.1–46.3)
RED CELL DISTRIBUTION WIDTH-SD: 45.3 FL (ref 35.1–46.3)
RED CELL DISTRIBUTION WIDTH-SD: 45.6 FL (ref 35.1–46.3)
RED CELL DISTRIBUTION WIDTH-SD: 46.1 FL (ref 35.1–46.3)
RED CELL DISTRIBUTION WIDTH-SD: 46.5 FL (ref 35.1–46.3)
RED CELL DISTRIBUTION WIDTH-SD: 46.8 FL (ref 35.1–46.3)
RED CELL DISTRIBUTION WIDTH-SD: 47.4 FL (ref 35.1–46.3)
RED CELL DISTRIBUTION WIDTH-SD: 47.7 FL (ref 35.1–46.3)
RED CELL DISTRIBUTION WIDTH-SD: 47.8 FL (ref 35.1–46.3)
RED CELL DISTRIBUTION WIDTH-SD: 48.4 FL (ref 35.1–46.3)
RED CELL DISTRIBUTION WIDTH-SD: 50.4 FL (ref 35.1–46.3)
SODIUM SERPL-SCNC: 124 MMOL/L (ref 136–144)
SODIUM SERPL-SCNC: 125 MMOL/L (ref 136–144)
SODIUM SERPL-SCNC: 126 MMOL/L (ref 136–144)
SODIUM SERPL-SCNC: 127 MMOL/L (ref 136–144)
SODIUM SERPL-SCNC: 128 MMOL/L (ref 136–144)
SODIUM SERPL-SCNC: 129 MMOL/L (ref 136–144)
SODIUM SERPL-SCNC: 130 MMOL/L (ref 136–144)
SODIUM SERPL-SCNC: 131 MMOL/L (ref 136–144)
SODIUM SERPL-SCNC: 132 MMOL/L (ref 136–144)
SODIUM SERPL-SCNC: 133 MMOL/L (ref 136–144)
SODIUM SERPL-SCNC: 134 MMOL/L (ref 136–144)
SODIUM SERPL-SCNC: 136 MMOL/L (ref 136–144)
SODIUM SERPL-SCNC: 137 MMOL/L (ref 136–144)
SODIUM SERPL-SCNC: 137 MMOL/L (ref 136–144)
SODIUM SERPL-SCNC: 139 MMOL/L (ref 136–144)
SODIUM SERPL-SCNC: 139 MMOL/L (ref 136–144)
SODIUM SERPL-SCNC: 140 MMOL/L (ref 136–144)
SODIUM SERPL-SCNC: 15 MMOL/L
SODIUM SERPL-SCNC: 35 MMOL/L
SP GR UR STRIP.AUTO: 1.01 (ref 1–1.03)
SP GR UR STRIP.AUTO: 1.01 (ref 1–1.03)
T AXIS: -10 DEGREES
T AXIS: -28 DEGREES
T AXIS: -34 DEGREES
T AXIS: 19 DEGREES
T AXIS: 7 DEGREES
T AXIS: 74 DEGREES
T AXIS: 8 DEGREES
T AXIS: 93 DEGREES
TOTAL CELLS COUNTED: 100
TOTAL CELLS COUNTED: 100
TOTAL IRON BINDING CAPACITY: 297 UG/DL (ref 298–536)
TOTAL PROTEIN PLEURAL FLUID: 3.6 G/DL
TRANSFERRIN: 199 MG/DL (ref 200–360)
TROPONIN: 0.08 NG/ML (ref ?–0.05)
TROPONIN: 0.14 NG/ML (ref ?–0.05)
TROPONIN: 0.17 NG/ML (ref ?–0.05)
TROPONIN: 0.18 NG/ML (ref ?–0.05)
TROPONIN: 1.88 NG/ML (ref ?–0.05)
TROPONIN: 3.08 NG/ML (ref ?–0.05)
TROPONIN: 3.13 NG/ML (ref ?–0.05)
TROPONIN: 3.49 NG/ML (ref ?–0.05)
TROPONIN: 3.58 NG/ML (ref ?–0.05)
TROPONIN: <0.046 NG/ML (ref ?–0.05)
TSI SER-ACNC: 0.99 MIU/ML (ref 0.35–5.5)
TSI SER-ACNC: 1.05 MIU/ML (ref 0.35–5.5)
UROBILINOGEN UR STRIP.AUTO-MCNC: <2 MG/DL
UROBILINOGEN UR STRIP.AUTO-MCNC: <2 MG/DL
VENTRICULAR RATE: 116 BPM
VENTRICULAR RATE: 32 BPM
VENTRICULAR RATE: 46 BPM
VENTRICULAR RATE: 47 BPM
VENTRICULAR RATE: 56 BPM
VENTRICULAR RATE: 59 BPM
VENTRICULAR RATE: 61 BPM
VENTRICULAR RATE: 71 BPM
WBC # BLD AUTO: 10 X10(3) UL (ref 4–13)
WBC # BLD AUTO: 10.1 X10(3) UL (ref 4–13)
WBC # BLD AUTO: 10.2 X10(3) UL (ref 4–13)
WBC # BLD AUTO: 10.9 X10(3) UL (ref 4–13)
WBC # BLD AUTO: 11 X10(3) UL (ref 4–13)
WBC # BLD AUTO: 11.2 X10(3) UL (ref 4–13)
WBC # BLD AUTO: 11.3 X10(3) UL (ref 4–13)
WBC # BLD AUTO: 11.7 X10(3) UL (ref 4–13)
WBC # BLD AUTO: 11.8 X10(3) UL (ref 4–13)
WBC # BLD AUTO: 12.3 X10(3) UL (ref 4–13)
WBC # BLD AUTO: 12.6 X10(3) UL (ref 4–13)
WBC # BLD AUTO: 13.4 X10(3) UL (ref 4–13)
WBC # BLD AUTO: 13.7 X10(3) UL (ref 4–13)
WBC # BLD AUTO: 13.8 X10(3) UL (ref 4–13)
WBC # BLD AUTO: 16.6 X10(3) UL (ref 4–13)
WBC # BLD AUTO: 19 X10(3) UL (ref 4–13)
WBC # BLD AUTO: 19.3 X10(3) UL (ref 4–13)

## 2017-01-01 PROCEDURE — 93005 ELECTROCARDIOGRAM TRACING: CPT

## 2017-01-01 PROCEDURE — 71010 XR CHEST AP PORTABLE  (CPT=71010): CPT

## 2017-01-01 PROCEDURE — 87077 CULTURE AEROBIC IDENTIFY: CPT | Performed by: INTERNAL MEDICINE

## 2017-01-01 PROCEDURE — 99233 SBSQ HOSP IP/OBS HIGH 50: CPT | Performed by: CLINICAL NURSE SPECIALIST

## 2017-01-01 PROCEDURE — 85025 COMPLETE CBC W/AUTO DIFF WBC: CPT | Performed by: HOSPITALIST

## 2017-01-01 PROCEDURE — 85730 THROMBOPLASTIN TIME PARTIAL: CPT | Performed by: EMERGENCY MEDICINE

## 2017-01-01 PROCEDURE — 97530 THERAPEUTIC ACTIVITIES: CPT

## 2017-01-01 PROCEDURE — 85610 PROTHROMBIN TIME: CPT | Performed by: EMERGENCY MEDICINE

## 2017-01-01 PROCEDURE — 99285 EMERGENCY DEPT VISIT HI MDM: CPT

## 2017-01-01 PROCEDURE — 32555 ASPIRATE PLEURA W/ IMAGING: CPT

## 2017-01-01 PROCEDURE — 80048 BASIC METABOLIC PNL TOTAL CA: CPT | Performed by: HOSPITALIST

## 2017-01-01 PROCEDURE — 85025 COMPLETE CBC W/AUTO DIFF WBC: CPT | Performed by: INTERNAL MEDICINE

## 2017-01-01 PROCEDURE — 96374 THER/PROPH/DIAG INJ IV PUSH: CPT

## 2017-01-01 PROCEDURE — 71275 CT ANGIOGRAPHY CHEST: CPT

## 2017-01-01 PROCEDURE — 85025 COMPLETE CBC W/AUTO DIFF WBC: CPT | Performed by: EMERGENCY MEDICINE

## 2017-01-01 PROCEDURE — 71020 XR CHEST PA + LAT CHEST (CPT=71020): CPT

## 2017-01-01 PROCEDURE — 83735 ASSAY OF MAGNESIUM: CPT | Performed by: INTERNAL MEDICINE

## 2017-01-01 PROCEDURE — 80048 BASIC METABOLIC PNL TOTAL CA: CPT | Performed by: INTERNAL MEDICINE

## 2017-01-01 PROCEDURE — 97116 GAIT TRAINING THERAPY: CPT

## 2017-01-01 PROCEDURE — 99232 SBSQ HOSP IP/OBS MODERATE 35: CPT | Performed by: INTERNAL MEDICINE

## 2017-01-01 PROCEDURE — 80053 COMPREHEN METABOLIC PANEL: CPT | Performed by: EMERGENCY MEDICINE

## 2017-01-01 PROCEDURE — 97110 THERAPEUTIC EXERCISES: CPT

## 2017-01-01 PROCEDURE — A4216 STERILE WATER/SALINE, 10 ML: HCPCS | Performed by: INTERNAL MEDICINE

## 2017-01-01 PROCEDURE — 96360 HYDRATION IV INFUSION INIT: CPT

## 2017-01-01 PROCEDURE — 99231 SBSQ HOSP IP/OBS SF/LOW 25: CPT | Performed by: CLINICAL NURSE SPECIALIST

## 2017-01-01 PROCEDURE — 83735 ASSAY OF MAGNESIUM: CPT | Performed by: HOSPITALIST

## 2017-01-01 PROCEDURE — 93010 ELECTROCARDIOGRAM REPORT: CPT

## 2017-01-01 PROCEDURE — 99223 1ST HOSP IP/OBS HIGH 75: CPT | Performed by: INTERNAL MEDICINE

## 2017-01-01 PROCEDURE — 99284 EMERGENCY DEPT VISIT MOD MDM: CPT

## 2017-01-01 PROCEDURE — 84132 ASSAY OF SERUM POTASSIUM: CPT | Performed by: HOSPITALIST

## 2017-01-01 PROCEDURE — 97162 PT EVAL MOD COMPLEX 30 MIN: CPT

## 2017-01-01 PROCEDURE — 0W993ZZ DRAINAGE OF RIGHT PLEURAL CAVITY, PERCUTANEOUS APPROACH: ICD-10-PCS | Performed by: RADIOLOGY

## 2017-01-01 PROCEDURE — 96361 HYDRATE IV INFUSION ADD-ON: CPT

## 2017-01-01 PROCEDURE — 87086 URINE CULTURE/COLONY COUNT: CPT | Performed by: EMERGENCY MEDICINE

## 2017-01-01 PROCEDURE — 72050 X-RAY EXAM NECK SPINE 4/5VWS: CPT

## 2017-01-01 PROCEDURE — 84484 ASSAY OF TROPONIN QUANT: CPT | Performed by: EMERGENCY MEDICINE

## 2017-01-01 PROCEDURE — 84157 ASSAY OF PROTEIN OTHER: CPT | Performed by: INTERNAL MEDICINE

## 2017-01-01 PROCEDURE — 71010 XR CHEST AP/PA (1 VIEW) (CPT=71010): CPT

## 2017-01-01 PROCEDURE — 87086 URINE CULTURE/COLONY COUNT: CPT | Performed by: INTERNAL MEDICINE

## 2017-01-01 PROCEDURE — 81001 URINALYSIS AUTO W/SCOPE: CPT | Performed by: EMERGENCY MEDICINE

## 2017-01-01 PROCEDURE — 83880 ASSAY OF NATRIURETIC PEPTIDE: CPT | Performed by: EMERGENCY MEDICINE

## 2017-01-01 PROCEDURE — 99231 SBSQ HOSP IP/OBS SF/LOW 25: CPT | Performed by: INTERNAL MEDICINE

## 2017-01-01 PROCEDURE — 82945 GLUCOSE OTHER FLUID: CPT | Performed by: INTERNAL MEDICINE

## 2017-01-01 PROCEDURE — 99356 PROLONGED SERV,INPATIENT,1ST HR: CPT | Performed by: CLINICAL NURSE SPECIALIST

## 2017-01-01 PROCEDURE — 70450 CT HEAD/BRAIN W/O DYE: CPT

## 2017-01-01 PROCEDURE — 4350F CNSLNG PROVIDED SYMP MNGMNT: CPT | Performed by: HOSPITALIST

## 2017-01-01 PROCEDURE — 85610 PROTHROMBIN TIME: CPT | Performed by: INTERNAL MEDICINE

## 2017-01-01 PROCEDURE — 99222 1ST HOSP IP/OBS MODERATE 55: CPT | Performed by: CLINICAL NURSE SPECIALIST

## 2017-01-01 PROCEDURE — 87186 SC STD MICRODIL/AGAR DIL: CPT | Performed by: INTERNAL MEDICINE

## 2017-01-01 PROCEDURE — 83615 LACTATE (LD) (LDH) ENZYME: CPT | Performed by: INTERNAL MEDICINE

## 2017-01-01 RX ORDER — DILTIAZEM HYDROCHLORIDE 120 MG/1
120 CAPSULE, COATED, EXTENDED RELEASE ORAL DAILY
Qty: 30 CAPSULE | Refills: 3 | Status: SHIPPED | OUTPATIENT
Start: 2017-01-01 | End: 2017-01-01

## 2017-01-01 RX ORDER — HEPARIN SODIUM AND DEXTROSE 10000; 5 [USP'U]/100ML; G/100ML
12 INJECTION INTRAVENOUS ONCE
Status: COMPLETED | OUTPATIENT
Start: 2017-01-01 | End: 2017-01-01

## 2017-01-01 RX ORDER — HEPARIN SODIUM AND DEXTROSE 10000; 5 [USP'U]/100ML; G/100ML
INJECTION INTRAVENOUS CONTINUOUS
Status: DISCONTINUED | OUTPATIENT
Start: 2017-01-01 | End: 2017-01-01

## 2017-01-01 RX ORDER — DEXTROSE MONOHYDRATE 25 G/50ML
50 INJECTION, SOLUTION INTRAVENOUS ONCE
Status: COMPLETED | OUTPATIENT
Start: 2017-01-01 | End: 2017-01-01

## 2017-01-01 RX ORDER — AMOXICILLIN 500 MG/1
500 CAPSULE ORAL 2 TIMES DAILY
Status: DISCONTINUED | OUTPATIENT
Start: 2017-01-01 | End: 2017-01-01

## 2017-01-01 RX ORDER — ALBUTEROL SULFATE 90 UG/1
2 AEROSOL, METERED RESPIRATORY (INHALATION) EVERY 6 HOURS PRN
Status: DISCONTINUED | OUTPATIENT
Start: 2017-01-01 | End: 2017-01-01

## 2017-01-01 RX ORDER — MAGNESIUM OXIDE 400 MG (241.3 MG MAGNESIUM) TABLET
400 TABLET ONCE
Status: COMPLETED | OUTPATIENT
Start: 2017-01-01 | End: 2017-01-01

## 2017-01-01 RX ORDER — ACETAMINOPHEN 650 MG/1
650 SUPPOSITORY RECTAL EVERY 6 HOURS SCHEDULED
Status: DISCONTINUED | OUTPATIENT
Start: 2017-01-01 | End: 2017-01-01

## 2017-01-01 RX ORDER — SCOLOPAMINE TRANSDERMAL SYSTEM 1 MG/1
1 PATCH, EXTENDED RELEASE TRANSDERMAL
Status: DISCONTINUED | OUTPATIENT
Start: 2017-01-01 | End: 2017-01-01

## 2017-01-01 RX ORDER — SODIUM PHOSPHATE,MONO-DIBASIC 19G-7G/118
1 ENEMA (ML) RECTAL DAILY
Status: DISCONTINUED | OUTPATIENT
Start: 2017-01-01 | End: 2017-01-01 | Stop reason: RX

## 2017-01-01 RX ORDER — ACETAMINOPHEN 160 MG/5ML
650 SOLUTION ORAL EVERY 6 HOURS SCHEDULED
Status: DISCONTINUED | OUTPATIENT
Start: 2017-01-01 | End: 2017-01-01

## 2017-01-01 RX ORDER — LORAZEPAM 2 MG/ML
0.5 INJECTION INTRAMUSCULAR EVERY 4 HOURS PRN
Status: DISCONTINUED | OUTPATIENT
Start: 2017-01-01 | End: 2017-01-01

## 2017-01-01 RX ORDER — ATROPINE SULFATE 10 MG/ML
2 SOLUTION/ DROPS OPHTHALMIC EVERY 2 HOUR PRN
Status: DISCONTINUED | OUTPATIENT
Start: 2017-01-01 | End: 2017-01-01

## 2017-01-01 RX ORDER — POTASSIUM CHLORIDE 20 MEQ/1
40 TABLET, EXTENDED RELEASE ORAL ONCE
Status: COMPLETED | OUTPATIENT
Start: 2017-01-01 | End: 2017-01-01

## 2017-01-01 RX ORDER — FUROSEMIDE 10 MG/ML
20 INJECTION INTRAMUSCULAR; INTRAVENOUS ONCE
Status: DISCONTINUED | OUTPATIENT
Start: 2017-01-01 | End: 2017-01-01

## 2017-01-01 RX ORDER — SODIUM PHOSPHATE, DIBASIC AND SODIUM PHOSPHATE, MONOBASIC 7; 19 G/133ML; G/133ML
1 ENEMA RECTAL ONCE AS NEEDED
Status: DISCONTINUED | OUTPATIENT
Start: 2017-01-01 | End: 2017-01-01

## 2017-01-01 RX ORDER — FUROSEMIDE 10 MG/ML
40 INJECTION INTRAMUSCULAR; INTRAVENOUS EVERY 8 HOURS PRN
Status: DISCONTINUED | OUTPATIENT
Start: 2017-01-01 | End: 2017-01-01

## 2017-01-01 RX ORDER — FLUTICASONE PROPIONATE 50 MCG
2 SPRAY, SUSPENSION (ML) NASAL 2 TIMES DAILY
Qty: 1 BOTTLE | Refills: 0 | Status: ON HOLD | OUTPATIENT
Start: 2017-01-01 | End: 2017-01-01

## 2017-01-01 RX ORDER — NITROFURANTOIN 25; 75 MG/1; MG/1
100 CAPSULE ORAL 2 TIMES DAILY
Status: DISCONTINUED | OUTPATIENT
Start: 2017-01-01 | End: 2017-01-01

## 2017-01-01 RX ORDER — GARLIC EXTRACT 500 MG
1 CAPSULE ORAL DAILY
Status: DISCONTINUED | OUTPATIENT
Start: 2017-01-01 | End: 2017-01-01

## 2017-01-01 RX ORDER — NITROGLYCERIN 0.6 MG/1
0.6 TABLET SUBLINGUAL EVERY 5 MIN PRN
Status: DISCONTINUED | OUTPATIENT
Start: 2017-01-01 | End: 2017-01-01

## 2017-01-01 RX ORDER — ONDANSETRON 2 MG/ML
4 INJECTION INTRAMUSCULAR; INTRAVENOUS EVERY 6 HOURS PRN
Status: DISCONTINUED | OUTPATIENT
Start: 2017-01-01 | End: 2017-01-01

## 2017-01-01 RX ORDER — ONDANSETRON 4 MG/1
4 TABLET, ORALLY DISINTEGRATING ORAL EVERY 6 HOURS PRN
Status: DISCONTINUED | OUTPATIENT
Start: 2017-01-01 | End: 2017-01-01

## 2017-01-01 RX ORDER — HEPARIN SODIUM 5000 [USP'U]/ML
60 INJECTION INTRAVENOUS; SUBCUTANEOUS ONCE
Status: COMPLETED | OUTPATIENT
Start: 2017-01-01 | End: 2017-01-01

## 2017-01-01 RX ORDER — SODIUM PHOSPHATE, DIBASIC AND SODIUM PHOSPHATE, MONOBASIC 7; 19 G/133ML; G/133ML
1 ENEMA RECTAL ONCE AS NEEDED
Status: ACTIVE | OUTPATIENT
Start: 2017-01-01 | End: 2017-01-01

## 2017-01-01 RX ORDER — ACETAMINOPHEN 500 MG
500 TABLET ORAL EVERY 6 HOURS PRN
Status: DISCONTINUED | OUTPATIENT
Start: 2017-01-01 | End: 2017-01-01

## 2017-01-01 RX ORDER — TIMOLOL MALEATE 5 MG/ML
1 SOLUTION/ DROPS OPHTHALMIC DAILY
Status: DISCONTINUED | OUTPATIENT
Start: 2017-01-01 | End: 2017-01-01

## 2017-01-01 RX ORDER — DOBUTAMINE HYDROCHLORIDE 100 MG/100ML
3.5 INJECTION INTRAVENOUS CONTINUOUS
Status: DISCONTINUED | OUTPATIENT
Start: 2017-01-01 | End: 2017-01-01

## 2017-01-01 RX ORDER — TORSEMIDE 20 MG/1
20 TABLET ORAL
Status: DISCONTINUED | OUTPATIENT
Start: 2017-01-01 | End: 2017-01-01

## 2017-01-01 RX ORDER — BUMETANIDE 0.25 MG/ML
1 INJECTION, SOLUTION INTRAMUSCULAR; INTRAVENOUS ONCE
Status: COMPLETED | OUTPATIENT
Start: 2017-01-01 | End: 2017-01-01

## 2017-01-01 RX ORDER — TORSEMIDE 20 MG/1
20 TABLET ORAL
Qty: 60 TABLET | Refills: 5 | Status: SHIPPED | OUTPATIENT
Start: 2017-01-01 | End: 2017-01-01

## 2017-01-01 RX ORDER — MORPHINE SULFATE 2 MG/ML
1 INJECTION, SOLUTION INTRAMUSCULAR; INTRAVENOUS
Status: DISCONTINUED | OUTPATIENT
Start: 2017-01-01 | End: 2017-01-01

## 2017-01-01 RX ORDER — DILTIAZEM HYDROCHLORIDE 240 MG/1
240 CAPSULE, COATED, EXTENDED RELEASE ORAL DAILY
Status: DISCONTINUED | OUTPATIENT
Start: 2017-01-01 | End: 2017-01-01

## 2017-01-01 RX ORDER — HYDROCODONE BITARTRATE AND ACETAMINOPHEN 5; 325 MG/1; MG/1
2 TABLET ORAL EVERY 4 HOURS PRN
Status: DISCONTINUED | OUTPATIENT
Start: 2017-01-01 | End: 2017-01-01

## 2017-01-01 RX ORDER — NITROGLYCERIN 20 MG/100ML
5 INJECTION INTRAVENOUS CONTINUOUS
Status: DISCONTINUED | OUTPATIENT
Start: 2017-01-01 | End: 2017-01-01

## 2017-01-01 RX ORDER — BISACODYL 10 MG
10 SUPPOSITORY, RECTAL RECTAL
Status: DISCONTINUED | OUTPATIENT
Start: 2017-01-01 | End: 2017-01-01

## 2017-01-01 RX ORDER — SODIUM CHLORIDE 9 MG/ML
INJECTION, SOLUTION INTRAVENOUS CONTINUOUS
Status: DISCONTINUED | OUTPATIENT
Start: 2017-01-01 | End: 2017-01-01

## 2017-01-01 RX ORDER — TORSEMIDE 20 MG/1
40 TABLET ORAL
Status: DISCONTINUED | OUTPATIENT
Start: 2017-01-01 | End: 2017-01-01

## 2017-01-01 RX ORDER — DILTIAZEM HYDROCHLORIDE 120 MG/1
120 CAPSULE, EXTENDED RELEASE ORAL DAILY
Status: DISCONTINUED | OUTPATIENT
Start: 2017-01-01 | End: 2017-01-01

## 2017-01-01 RX ORDER — TORSEMIDE 20 MG/1
20 TABLET ORAL 2 TIMES DAILY
Qty: 60 TABLET | Refills: 3 | Status: ON HOLD
Start: 2017-01-01 | End: 2017-01-01

## 2017-01-01 RX ORDER — DILTIAZEM HYDROCHLORIDE 120 MG/1
120 CAPSULE, COATED, EXTENDED RELEASE ORAL DAILY
Status: ON HOLD | COMMUNITY
End: 2017-01-01

## 2017-01-01 RX ORDER — GLYCOPYRROLATE 0.2 MG/ML
0.4 INJECTION, SOLUTION INTRAMUSCULAR; INTRAVENOUS
Status: DISCONTINUED | OUTPATIENT
Start: 2017-01-01 | End: 2017-01-01

## 2017-01-01 RX ORDER — TOLVAPTAN 15 MG/1
15 TABLET ORAL ONCE
Status: COMPLETED | OUTPATIENT
Start: 2017-01-01 | End: 2017-01-01

## 2017-01-01 RX ORDER — FUROSEMIDE 40 MG/1
40 TABLET ORAL EVERY 8 HOURS PRN
Status: DISCONTINUED | OUTPATIENT
Start: 2017-01-01 | End: 2017-01-01

## 2017-01-01 RX ORDER — DOBUTAMINE HYDROCHLORIDE 100 MG/100ML
INJECTION INTRAVENOUS CONTINUOUS
Status: DISCONTINUED | OUTPATIENT
Start: 2017-01-01 | End: 2017-01-01

## 2017-01-01 RX ORDER — SODIUM PHOSPHATE,MONO-DIBASIC 19G-7G/118
1 ENEMA (ML) RECTAL DAILY
Status: ON HOLD | COMMUNITY
End: 2017-01-01

## 2017-01-01 RX ORDER — HEPARIN SODIUM 5000 [USP'U]/ML
5000 INJECTION, SOLUTION INTRAVENOUS; SUBCUTANEOUS EVERY 8 HOURS
Status: DISCONTINUED | OUTPATIENT
Start: 2017-01-01 | End: 2017-01-01

## 2017-01-01 RX ORDER — CLOPIDOGREL BISULFATE 75 MG/1
75 TABLET ORAL DAILY
Status: DISCONTINUED | OUTPATIENT
Start: 2017-01-01 | End: 2017-01-01

## 2017-01-01 RX ORDER — POTASSIUM CHLORIDE 20 MEQ/1
40 TABLET, EXTENDED RELEASE ORAL EVERY 4 HOURS
Status: COMPLETED | OUTPATIENT
Start: 2017-01-01 | End: 2017-01-01

## 2017-01-01 RX ORDER — POLYETHYLENE GLYCOL 3350 17 G/17G
17 POWDER, FOR SOLUTION ORAL DAILY PRN
Status: DISCONTINUED | OUTPATIENT
Start: 2017-01-01 | End: 2017-01-01

## 2017-01-01 RX ORDER — NITROGLYCERIN 20 MG/100ML
INJECTION INTRAVENOUS
Status: COMPLETED
Start: 2017-01-01 | End: 2017-01-01

## 2017-01-01 RX ORDER — DILTIAZEM HYDROCHLORIDE 240 MG/1
240 CAPSULE, COATED, EXTENDED RELEASE ORAL DAILY
Qty: 30 CAPSULE | Refills: 1 | Status: ON HOLD | OUTPATIENT
Start: 2017-01-01 | End: 2017-01-01

## 2017-01-01 RX ORDER — HYDROCODONE BITARTRATE AND ACETAMINOPHEN 5; 325 MG/1; MG/1
1 TABLET ORAL EVERY 4 HOURS PRN
Status: DISCONTINUED | OUTPATIENT
Start: 2017-01-01 | End: 2017-01-01

## 2017-01-01 RX ORDER — BUMETANIDE 0.25 MG/ML
2 INJECTION, SOLUTION INTRAMUSCULAR; INTRAVENOUS
Status: DISCONTINUED | OUTPATIENT
Start: 2017-01-01 | End: 2017-01-01

## 2017-01-01 RX ORDER — FLUTICASONE PROPIONATE 50 MCG
2 SPRAY, SUSPENSION (ML) NASAL 2 TIMES DAILY
Status: DISCONTINUED | OUTPATIENT
Start: 2017-01-01 | End: 2017-01-01

## 2017-01-01 RX ORDER — LATANOPROST 50 UG/ML
1 SOLUTION/ DROPS OPHTHALMIC NIGHTLY
Status: ON HOLD | COMMUNITY
End: 2017-01-01

## 2017-01-01 RX ORDER — BUMETANIDE 0.25 MG/ML
1 INJECTION, SOLUTION INTRAMUSCULAR; INTRAVENOUS
Status: DISCONTINUED | OUTPATIENT
Start: 2017-01-01 | End: 2017-01-01

## 2017-01-01 RX ORDER — LORAZEPAM 2 MG/ML
2 INJECTION INTRAMUSCULAR EVERY 4 HOURS PRN
Status: DISCONTINUED | OUTPATIENT
Start: 2017-01-01 | End: 2017-01-01

## 2017-01-01 RX ORDER — TRAMADOL HYDROCHLORIDE 50 MG/1
25 TABLET ORAL EVERY 8 HOURS PRN
Qty: 15 TABLET | Refills: 0 | Status: ON HOLD | OUTPATIENT
Start: 2017-01-01 | End: 2017-01-01

## 2017-01-01 RX ORDER — TORSEMIDE 20 MG/1
40 TABLET ORAL
Qty: 60 TABLET | Refills: 3 | Status: SHIPPED | OUTPATIENT
Start: 2017-01-01 | End: 2017-01-01

## 2017-01-01 RX ORDER — ENOXAPARIN SODIUM 100 MG/ML
1 INJECTION SUBCUTANEOUS DAILY
Status: DISCONTINUED | OUTPATIENT
Start: 2017-01-01 | End: 2017-01-01

## 2017-01-01 RX ORDER — NITROGLYCERIN 0.4 MG/1
TABLET SUBLINGUAL
Status: COMPLETED
Start: 2017-01-01 | End: 2017-01-01

## 2017-01-01 RX ORDER — FLUOCINOLONE ACETONIDE 0.1 MG/ML
1 SOLUTION TOPICAL DAILY PRN
Status: ON HOLD | COMMUNITY
End: 2017-01-01

## 2017-01-01 RX ORDER — LIDOCAINE 50 MG/G
1 PATCH TOPICAL
Status: DISCONTINUED | OUTPATIENT
Start: 2017-01-01 | End: 2017-01-01

## 2017-01-01 RX ORDER — AMOXICILLIN 500 MG/1
500 CAPSULE ORAL 2 TIMES DAILY
Status: DISPENSED | OUTPATIENT
Start: 2017-01-01 | End: 2017-01-01

## 2017-01-01 RX ORDER — NITROGLYCERIN 0.4 MG/1
0.4 TABLET SUBLINGUAL EVERY 5 MIN PRN
Status: DISCONTINUED | OUTPATIENT
Start: 2017-01-01 | End: 2017-01-01

## 2017-01-01 RX ORDER — AMOXICILLIN AND CLAVULANATE POTASSIUM 500; 125 MG/1; MG/1
500 TABLET, FILM COATED ORAL DAILY
Status: DISCONTINUED | OUTPATIENT
Start: 2017-01-01 | End: 2017-01-01

## 2017-01-01 RX ORDER — ONDANSETRON 2 MG/ML
4 INJECTION INTRAMUSCULAR; INTRAVENOUS ONCE
Status: COMPLETED | OUTPATIENT
Start: 2017-01-01 | End: 2017-01-01

## 2017-01-01 RX ORDER — MORPHINE SULFATE 2 MG/ML
2 INJECTION, SOLUTION INTRAMUSCULAR; INTRAVENOUS EVERY 2 HOUR PRN
Status: DISCONTINUED | OUTPATIENT
Start: 2017-01-01 | End: 2017-01-01

## 2017-01-01 RX ORDER — ISOSORBIDE MONONITRATE 60 MG/1
60 TABLET, EXTENDED RELEASE ORAL DAILY
Status: DISCONTINUED | OUTPATIENT
Start: 2017-01-01 | End: 2017-01-01

## 2017-01-01 RX ORDER — METOPROLOL SUCCINATE 25 MG/1
25 TABLET, EXTENDED RELEASE ORAL
Status: DISCONTINUED | OUTPATIENT
Start: 2017-01-01 | End: 2017-01-01

## 2017-01-01 RX ORDER — LATANOPROST 50 UG/ML
1 SOLUTION/ DROPS OPHTHALMIC NIGHTLY
Status: DISCONTINUED | OUTPATIENT
Start: 2017-01-01 | End: 2017-01-01

## 2017-01-01 RX ORDER — HYDRALAZINE HYDROCHLORIDE 20 MG/ML
10 INJECTION INTRAMUSCULAR; INTRAVENOUS EVERY 6 HOURS PRN
Status: DISCONTINUED | OUTPATIENT
Start: 2017-01-01 | End: 2017-01-01

## 2017-01-01 RX ORDER — GLYCOPYRROLATE 0.2 MG/ML
0.2 INJECTION, SOLUTION INTRAMUSCULAR; INTRAVENOUS
Status: DISCONTINUED | OUTPATIENT
Start: 2017-01-01 | End: 2017-01-01

## 2017-01-01 RX ORDER — SODIUM CHLORIDE 9 MG/ML
INJECTION, SOLUTION INTRAVENOUS ONCE
Status: COMPLETED | OUTPATIENT
Start: 2017-01-01 | End: 2017-01-01

## 2017-01-01 RX ORDER — AMOXICILLIN 500 MG/1
500 CAPSULE ORAL 2 TIMES DAILY
Qty: 10 CAPSULE | Refills: 0 | Status: ON HOLD | OUTPATIENT
Start: 2017-01-01 | End: 2017-01-01

## 2017-01-01 RX ORDER — MORPHINE SULFATE 2 MG/ML
1 INJECTION, SOLUTION INTRAMUSCULAR; INTRAVENOUS EVERY 2 HOUR PRN
Status: DISCONTINUED | OUTPATIENT
Start: 2017-01-01 | End: 2017-01-01

## 2017-01-01 RX ORDER — LORAZEPAM 2 MG/ML
1 INJECTION INTRAMUSCULAR EVERY 4 HOURS PRN
Status: DISCONTINUED | OUTPATIENT
Start: 2017-01-01 | End: 2017-01-01

## 2017-01-01 RX ORDER — ISOSORBIDE MONONITRATE 30 MG/1
30 TABLET, EXTENDED RELEASE ORAL DAILY
Status: DISCONTINUED | OUTPATIENT
Start: 2017-01-01 | End: 2017-01-01

## 2017-01-01 RX ORDER — ENOXAPARIN SODIUM 100 MG/ML
1 INJECTION SUBCUTANEOUS ONCE
Status: COMPLETED | OUTPATIENT
Start: 2017-01-01 | End: 2017-01-01

## 2017-01-01 RX ORDER — SODIUM POLYSTYRENE SULFONATE 15 G/60ML
15 SUSPENSION ORAL; RECTAL ONCE
Status: DISCONTINUED | OUTPATIENT
Start: 2017-01-01 | End: 2017-01-01

## 2017-01-01 RX ORDER — ISOSORBIDE MONONITRATE 30 MG/1
15 TABLET, EXTENDED RELEASE ORAL DAILY
Status: DISCONTINUED | OUTPATIENT
Start: 2017-01-01 | End: 2017-01-01

## 2017-01-01 RX ORDER — HEPARIN SODIUM 5000 [USP'U]/ML
5000 INJECTION, SOLUTION INTRAVENOUS; SUBCUTANEOUS EVERY 8 HOURS SCHEDULED
Status: DISCONTINUED | OUTPATIENT
Start: 2017-01-01 | End: 2017-01-01

## 2017-01-01 RX ORDER — TRAMADOL HYDROCHLORIDE 50 MG/1
25 TABLET ORAL EVERY 6 HOURS PRN
Status: DISCONTINUED | OUTPATIENT
Start: 2017-01-01 | End: 2017-01-01

## 2017-01-01 RX ORDER — MAGNESIUM OXIDE 400 MG (241.3 MG MAGNESIUM) TABLET
800 TABLET ONCE
Status: COMPLETED | OUTPATIENT
Start: 2017-01-01 | End: 2017-01-01

## 2017-01-01 RX ORDER — DOCUSATE SODIUM 100 MG/1
100 CAPSULE, LIQUID FILLED ORAL 2 TIMES DAILY
Status: DISCONTINUED | OUTPATIENT
Start: 2017-01-01 | End: 2017-01-01

## 2017-01-01 RX ORDER — FUROSEMIDE 10 MG/ML
20 INJECTION INTRAMUSCULAR; INTRAVENOUS ONCE
Status: COMPLETED | OUTPATIENT
Start: 2017-01-01 | End: 2017-01-01

## 2017-01-01 RX ORDER — ACETAMINOPHEN 500 MG
500 TABLET ORAL EVERY 6 HOURS PRN
Status: ON HOLD | COMMUNITY
End: 2017-01-01

## 2017-01-01 RX ORDER — BUMETANIDE 0.25 MG/ML
2 INJECTION, SOLUTION INTRAMUSCULAR; INTRAVENOUS EVERY 8 HOURS
Status: DISCONTINUED | OUTPATIENT
Start: 2017-01-01 | End: 2017-01-01

## 2017-01-01 RX ORDER — LORAZEPAM 2 MG/ML
0.5 INJECTION INTRAMUSCULAR ONCE
Status: DISCONTINUED | OUTPATIENT
Start: 2017-01-01 | End: 2017-01-01

## 2017-01-01 RX ORDER — ENOXAPARIN SODIUM 100 MG/ML
40 INJECTION SUBCUTANEOUS DAILY
Status: DISCONTINUED | OUTPATIENT
Start: 2017-01-01 | End: 2017-01-01

## 2017-01-01 RX ORDER — NITROGLYCERIN 20 MG/100ML
INJECTION INTRAVENOUS CONTINUOUS
Status: DISCONTINUED | OUTPATIENT
Start: 2017-01-01 | End: 2017-01-01

## 2017-01-01 RX ORDER — SODIUM CHLORIDE 9 MG/ML
1000 INJECTION, SOLUTION INTRAVENOUS ONCE
Status: COMPLETED | OUTPATIENT
Start: 2017-01-01 | End: 2017-01-01

## 2017-01-01 RX ORDER — MORPHINE SULFATE 4 MG/ML
4 INJECTION, SOLUTION INTRAMUSCULAR; INTRAVENOUS EVERY 2 HOUR PRN
Status: DISCONTINUED | OUTPATIENT
Start: 2017-01-01 | End: 2017-01-01

## 2017-01-01 RX ORDER — ACETAMINOPHEN 325 MG/1
650 TABLET ORAL EVERY 6 HOURS PRN
Status: DISCONTINUED | OUTPATIENT
Start: 2017-01-01 | End: 2017-01-01

## 2017-01-19 PROBLEM — N19 RENAL FAILURE: Status: ACTIVE | Noted: 2017-01-01

## 2017-01-19 PROBLEM — R00.2 PALPITATIONS: Status: ACTIVE | Noted: 2017-01-01

## 2017-01-19 PROBLEM — R06.02 SHORTNESS OF BREATH: Status: ACTIVE | Noted: 2017-01-01

## 2017-01-19 NOTE — ED PROVIDER NOTES
Patient Seen in: [facility] Emergency Department    History   Patient presents with:  Arrythmia/Palpitations (cardiovascular)    Stated Complaint: irregular heart beat    HPI    8-year-old female that comes the hospital to chief complaint of carolyn mg total) by mouth BID (Diuretic).    Timolol Maleate 0.5 % Ophthalmic Solution,     Ketoconazole 2 % External Shampoo,  Wash scalp 1-3 times weekly PRN   Fluocinolone Acetonide 0.01 % External Solution,  Apply to AAs of scalp BID x 2 weeks, then PRN flares rate and rhythm  Lungs: Clear to auscultation bilaterally  Abdomen: Soft nontender nondistended normal active bowel sounds without rebound, guarding or masses noted  Back nontender without CVA tenderness  Extremity no clubbing, cyanosis or edema noted.   Fu Impression:     PROCEDURE:  XR CHEST AP PORTABLE (CPT=71010)     TECHNIQUE:  AP chest radiograph was obtained.     COMPARISON:  [facility] , XR CHEST PA + LAT CHEST (CPT=71020), 11/28/2016, 7:18.     INDICATIONS:  irregular heart beat     PATIENT STATED HISTOR

## 2017-01-19 NOTE — ED INITIAL ASSESSMENT (HPI)
\"Irregular heartbeat\" since 12pm today. Does have history of this a few times in the past. Patient also endorses shortness of breath.

## 2017-01-20 NOTE — CONSULTS
[facility]  Report of Consultation    Jim Ponceing Patient Status:  Observation    1915 MRN [id number]   [facility] 7NE-A Attending Jessica Orozco, *   Hosp Day # 1 PCP Carlos Jacob MD     Reason for Consultation: infusion, 2.5-40 mcg/kg/min, Intravenous, Continuous  •  CefTRIAXone Sodium (ROCEPHIN) 1 g in sodium chloride 0.9 % 100 mL IVPB Add-vantage, 1 g, Intravenous, Q24H  •  acetaminophen (TYLENOL EXTRA STRENGTH) tab 500 mg, 500 mg, Oral, Q6H PRN  •  Acidophilus noted.   Integument: No lesions. No erythema. Psychiatric: Appropriate mood and affect.     Laboratory:    Lab Results  Component Value Date   WBC 11.3 01/20/2017   HGB 10.2 01/20/2017   HCT 31.3 01/20/2017   .0 01/20/2017   CREATSERUM 3.36 01/20/20 today; BB stopped. Possible pacemaker.  - monitor labs  - recheck renal US  2. Hyponatremia - improving; continue saline  TSH OK  3. UTI - on emperic abx; f/u cultures  4.  Anemia - check iron; monitor      Thank you for allowing me to participate in this p

## 2017-01-20 NOTE — PROGRESS NOTES
[facility] Hospitalist Progress Note     PCP: Rachel Cannon MD    CC:  Follow up    SUBJECTIVE:  Pt sitting up in chair, visitor at bedside. Sob improved. No current palpitations.  Pt refused abx and wants to wait for UC    OBJECTIVE:  Temp:  [97.5 °F (36.4 °C (Porcine)  5,000 Units Subcutaneous Q8H     • DOBUTamine in D5W 5 mcg/kg/min (01/20/17 1034)   • sodium chloride 100 mL/hr at 01/20/17 0443     acetaminophen, Albuterol Sulfate HFA, PEG 3350, bisacodyl, ondansetron HCl       Assessment/Plan:     Pt is a 10

## 2017-01-20 NOTE — CM/SW NOTE
01/20/17 1500   CM/SW Referral Data   Referral Source Physician;Nurse   Reason for Referral Discharge planning   Informant Patient   Pertinent Medical Hx   Primary Care Physician Name Dr Staci Sawyer   Date of Last Contact with PCP 1/13/2017   Patient Info

## 2017-01-20 NOTE — CERTIFICATION
**Certification    PHYSICIAN Certification of Need for Inpatient Hospitalization    Based on the her current state of illness, Nilson Piña requires inpatient hospitalization for her mchugh and palpitations.   This requires inpatient medical treatment armaan

## 2017-01-20 NOTE — PHYSICAL THERAPY NOTE
PHYSICAL THERAPY EVALUATION - INPATIENT     Room Number: 9278/3067-I  Evaluation Date: 1/20/2017  Type of Evaluation: Initial  Physician Order: PT Eval and Treat    Presenting Problem: dyspnea on exertion, bradycardia  Reason for Therapy: Mobility Dysf PAIN ASSESSMENT  Rating: Unable to rate  Location: low back  Management Techniques: Activity promotion; Body mechanics;Repositioning    COGNITION  · Safety Judgement:  decreased awareness of need for assistance and decreased awareness of need for safety with alternating marching to 47 bpm. Pt denies dizziness. Transfers completed with rolling walker and CGA; minimal verbal cues required for sequencing and safety.  Pt tolerates ambulation x 15 feet with rolling walker and CGA; SpO2 remains 97% on 2L O2, hea transfers Sit to/from Stand at assistance level: modified independent     Goal #3 Patient is able to ambulate 50 feet with assist device: walker - rolling at assistance level: modified independent     Goal #4    Goal #5    Goal #6    Goal Comments: Goals e

## 2017-01-20 NOTE — PROGRESS NOTES
NURSING ADMISSION NOTE      Patient admitted via Cart  Oriented to room. Safety precautions initiated. Bed in low position. Call light in reach. Admission navigator completed, report to [facility].

## 2017-01-20 NOTE — CONSULTS
[facility] Cardiology Consultation    Sherley Hayes Patient Status:  Observation    1915 MRN [id number]   [facility] 7NE-A Attending Marii Gottlieb, *   Hosp Day # 1 PCP Yadiel Rod MD     Reason for Consultation:  Dyspnea, br Subcutaneous Q8H       Continuous Infusions:  • DOBUTamine in D5W     • sodium chloride 100 mL/hr at 01/20/17 7843       Social History:   reports that she has never smoked.  She has never used smokeless tobacco. She reports that she does not drink alcohol dobutamine. Stop toprol. Other than age, she is candidate for PPM with PAF and fredy. Continue to follow tele. Gentle fluids. F/u labs.     Willy Ricks  1/20/2017  7:50 AM

## 2017-01-20 NOTE — H&P
[facility] Hospitalist H&P       CC: PATHAK and palpitations    PCP: Donnie Rod MD    History of Present Illness: Pt is a [age] yo with HTN, chronic diastolic HF, macular degeneration, glaucoma, OA, who is admitted for PATHAK and palpitations that started earli daily. Disp:  Rfl:    Fluocinolone Acetonide 0.01 % External Solution Apply 1 Application topically daily as needed. Disp:  Rfl:    acetaminophen 500 MG Oral Tab Take 500 mg by mouth every 6 (six) hours as needed for Pain.  Disp:  Rfl:    latanoprost 0.005 Abdomen:   Soft, non-tender. Bowel sounds normal. No masses,  No organomegaly. Non distended, no overt hernias   Extremities: Extremities normal, atraumatic, no cyanosis or edema. Skin: Skin color, texture, turgor normal. No visible rashes or lesions. with HTN, macular degeneration, glaucoma, OA, who is admitted for PATHAK and palpitations that started earlier today. **PATHAK and palpitations in setting of chronic diastolic HF  -EKG noted above. Cards consulted, appreciate.   meds per cards  -CXR with no ev

## 2017-01-21 NOTE — PLAN OF CARE
HRs 40s-50's junctional, until 0430. Now SR 76s. Lungs clear but diminished. Denies SOB. Prefers 2L O2 per NC and sat >95%. No sign of desat when of O2. Declined weaning from supplemental O2. Debutamine gtt ongoing.  Urine output as documented i

## 2017-01-21 NOTE — PROGRESS NOTES
Assumed pt care 0730   Pt a & o x 3 forgetful vss on room air  nsr on tele   No complaints of pain   Urine culture positive pt states she does not want to take po antibiotics   Dr Triny Ramirez paged ampicillin ordered pharmacy to dose   Dobutamine gtt infusi

## 2017-01-21 NOTE — PROGRESS NOTES
[facility] Pharmacy Note:  Renal Adjustment for Augmentin (amoxicillin/clavulonate)    Catherine Lunsford is a 8 year old female who has been prescribed Augmentin (amoxicillin/clavulonate) 500 mg every 12 hrs.   CrCl is estimated creatinine clearance is 6.7 mL/m

## 2017-01-21 NOTE — PROGRESS NOTES
[facility]  Nephrology Progress Note    Lucho Rendon Patient Status:  Inpatient    1915 MRN [id number]   [facility] 7NE-A Attending Salvatore Harada, *   Hosp Day # 2 PCP Donis Hammans, MD       SUBJECTIVE:  Up in [facility] 101*  96*   CREATSERUM  2.91*  3.36*  3.13*   CA  8.7  9.1  8.5   MG   --   2.3  2.1   GLU  114*  116*  112*       Recent Labs   Lab  01/19/17   1627   ALT  18   AST  25   ALB  3.4*       No results for input(s): PGLU in the last 72 hours.     Meds:     Cur

## 2017-01-21 NOTE — PROGRESS NOTES
01/21/17 1714   Clinical Encounter Type   Visited With Patient and family together   Routine Visit Introduction   Continue Visiting No   Patient's Supportive Strategies/Resources  provided emotional support, including active listening and acknow

## 2017-01-21 NOTE — PROGRESS NOTES
[facility] Dosing Service  Antibiotic Dosing    See Encarnacion is a 8 year old female for whom pharmacy is dosing Unasyn for treatment of  UTI. .  Other antibiotics (Not dosed by pharmacy):  Was taking Augmentin    Allergies: is allergic to aspirin;

## 2017-01-21 NOTE — PROGRESS NOTES
[facility] Cardiology Progress Note        Jason Bachelor Patient Status:  Inpatient    1915 MRN [id number]   [facility] 7NE-A Attending Kelley Isabel, [facility] Day # 2 PCP MD Ted Shah fredy    EKG:      Echo:      Cardiac Cath:      Labs:  HEM:  Recent Labs   Lab  01/19/17   1627  01/20/17   0442   WBC  12.6  11.3   HGB  10.7*  10.2*   PLT  225.0  205.0       Chem:  Recent Labs   Lab  01/19/17   1627  01/20/17   0442  01/21/17   0423

## 2017-01-21 NOTE — PLAN OF CARE
Patient was placed on dobutamine drip for low heart rate. Toprol on hold. May need to contemplate pacemaker if no changes. Gently hydrating her. Known to have UTI. BP coming up and HR upper 40's-50 and improved from dobutimine.   CARDIOVASCULAR - ADULT    •

## 2017-01-21 NOTE — PROGRESS NOTES
[facility] Hospitalist Progress Note     PCP: Teofilo Goodrich MD    CC:  Follow up    SUBJECTIVE:  Pt sitting up in chair, visitor at bedside. No current palpitations.  Now accepting abx    OBJECTIVE:  Temp:  [97.4 °F (36.3 °C)-97.8 °F (36.6 °C)] 97.8 °F (36.6 Eyes Nightly   • Timolol Maleate  1 drop Left Eye Daily   • Heparin Sodium (Porcine)  5,000 Units Subcutaneous Q8H     • DOBUTamine in D5W 5 mcg/kg/min (01/21/17 0228)   • sodium chloride 100 mL/hr at 01/21/17 0032     acetaminophen, Albuterol Sulfate HFA,

## 2017-01-22 NOTE — PROGRESS NOTES
[facility] Hospitalist Progress Note     PCP: Jostin Goel MD    CC:  Follow up    SUBJECTIVE:  Pt laying in bed, RNs in room. Pt's IVF and dobutamine were d/c'ed this AM.  Pt feeling very weak and bp 76T systolic.   IVF placed back on, and in processes o 114*  116*  112*  98       Recent Labs   Lab  01/19/17   1627   ALT  18   AST  25   ALB  3.4*       No results for input(s): PGLU in the last 72 hours.     Recent Labs   Lab  01/19/17   1627   TROP  <0.046       Meds:     • Amoxicillin-Pot Clavulanate  500 monitor. Stable near baseline    **HTN. meds per cards    **macular degeneration, glaucoma, osteoarthritis-no acute issues, continue home meds.  Pt reports being on eye drops for years--sees [facility]    **Ppx-scds, heparin subq    Questions/concer

## 2017-01-22 NOTE — PLAN OF CARE
Pt A/Ox3, [place], legally blind, on 3L per NC, NST per tele, c/o of posterior back pain, Tylenol given with relief  Pt's HR was in the 110-120's, BP was elevated, Dobutamine gtt d/c'd at 0830  1100 Pt found on floor, responsive, pt states she slid off her ezekiel

## 2017-01-22 NOTE — PROGRESS NOTES
[facility] Cardiology Progress Note        Jim Ponceing Patient Status:  Inpatient    1915 MRN [id number]   [facility] 7NE-A Attending Jessica Orozco, [address] Day # 3 PCP MD Shahla So fredy    EKG:      Echo:      Cardiac Cath:      Labs:  HEM:  Recent Labs   Lab  01/19/17   1627  01/20/17   0442   WBC  12.6  11.3   HGB  10.7*  10.2*   PLT  225.0  205.0       Chem:  Recent Labs   Lab  01/19/17   1627  01/20/17   0442  01/21/17   0423  0

## 2017-01-22 NOTE — PROGRESS NOTES
Called to evaluate pt as she was found on the floor, but she was responsive. BP 70s/30s. HR 50s. Pt was in the chair & wanted to get back to bed. She thought her bedside table was her walker & tried to get up (pt can not see well & is legally blind).   Andrew Pruett

## 2017-01-22 NOTE — PLAN OF CARE
Assumed care at 1900. No acute issues overnight. Forgetful at times. Maintained NSR overnight. HR 90s--100s. Dobutamine gtt at 15.2ml/hour. Po abx for UTI  Tylenol for arthritic pain. IVF as ordered. Vitals stable.      CARDIOVASCULAR - ADULT    •

## 2017-01-22 NOTE — PROGRESS NOTES
[facility]  Nephrology Progress Note    Theodore Steele Patient Status:  Inpatient    1915 MRN [id number]   [facility] 7NE-A Attending Veronica Lyon, *   Hosp Day # 3 PCP Jose Elias Goodrich MD       SUBJECTIVE:  Up in [facility] 96*  98*  109   CO2  25.0  27.0  25.0  23.0   BUN  89*  101*  96*  57*   CREATSERUM  2.91*  3.36*  3.13*  1.72*   CA  8.7  9.1  8.5  8.6   MG   --   2.3  2.1   --    GLU  114*  116*  112*  98       Recent Labs   Lab  01/19/17   1627   ALT  18   AST  25

## 2017-01-23 NOTE — PLAN OF CARE
Assumed care at 1900. No acute issues overnight. Fall precautions in place. Has maintained NSR on tele. HR 80s-90s. Dobutamine gtt at 17.2ml/hour  IVF as ordered. Will monitor.      CARDIOVASCULAR - ADULT    • Maintains optimal cardiac output and he

## 2017-01-23 NOTE — PROGRESS NOTES
Pharmacy Note: Renal dose adjustment of Amoxicillin    Ankit Mario is a 8 year old female who has been prescribed Amoxicillin 500 mg po q8h.   CrCl is 13.7 ml/min so the dose has been adjusted  to Amoxicillin 500 mg po q12h per hospital renal dose

## 2017-01-23 NOTE — PHYSICAL THERAPY NOTE
PHYSICAL THERAPY TREATMENT NOTE - INPATIENT    Room Number: 8082/0952-N     Session: 1   Number of Visits to Meet Established Goals: 5    Presenting Problem: dyspnea on exertion, bradycardia    Problem List  Principal Problem:    Palpitations  Active Prob on and standing up from a chair with arms (e.g., wheelchair, bedside commode, etc.): A Little   -   Moving from lying on back to sitting on the side of the bed?: None   How much help from another person does the patient currently need. ..   -   Moving to an distance today. O2 sats remain wfl on room air, pt does report SOB.  Patient continues to demonstrate impairments in strength, balance, endurance, mobility and functional independence, and will benefit from continued skilled PT during patient's IP stay to a

## 2017-01-23 NOTE — PROGRESS NOTES
[facility]  Progress Note    Desire Houston Patient Status:  Observation    1915 MRN [id number]   [facility] 7NE-A Attending Jakob Gao, *   Hosp Day # 4 PCP Gilson Wilkes MD       Chart reviewed; + fall yesterday me with any questions or concerns.     Cristopher Scott MD  1/23/2017

## 2017-01-23 NOTE — PROGRESS NOTES
[facility] Hospitalist Progress Note                                                                   [address] [facility]  4/11/1915    CC: FU fall    Interval History:  - Yenni Arguello yesterday, put back CREATSERUM  3.13*  1.72*  1.53*   CA  8.5  8.6  8.4   NA  131*  139  140   K  4.4  4.0  4.3   CL  98*  109  113*   CO2  25.0  23.0  19.0*       ROS: no change to ROS from my documentation yesterday, except as otherwise noted in the Interval History above osteoarthritis-no acute issues, continue home meds. Pt reports being on eye drops for years--sees [facility]    **Ppx-scds, heparin subq    Questions/concerns were discussed with patient and/or family by bedside. Likely D/C tomorrow.     Johana Capone

## 2017-01-23 NOTE — CM/SW NOTE
Received a call from [facility] [phone] saying that they are active with pt. Called Michelle at [facility] to tell her to cancel their [facility] order for pt.

## 2017-01-23 NOTE — PROGRESS NOTES
[facility] Cardiology Progress Note        Jim Ponceing Patient Status:  Inpatient    1915 MRN [id number]   [facility] 7NE-A Attending Jessica Orozco, [address] [address] [place] Day # 4 PCP MD Shahla So fredy    EKG:      Echo:      Cardiac Cath:      Labs:  HEM:  Recent Labs   Lab  01/22/17   1201   HGB  9.8*       Chem:  Recent Labs   Lab  01/21/17   0423  01/22/17   0533  01/23/17   0411   NA  131*  139  140   K  4.4  4.0  4.3   CL  98*  109  113*   CO

## 2017-01-24 NOTE — PHYSICAL THERAPY NOTE
PHYSICAL THERAPY TREATMENT NOTE - INPATIENT    Room Number: 8401/4638-S     Session: 2   Number of Visits to Meet Established Goals: 5    Presenting Problem: dyspnea on exertion, bradycardia    Problem List  Principal Problem:    Palpitations  Active Prob Moving from lying on back to sitting on the side of the bed?: None   How much help from another person does the patient currently need. ..   -   Moving to and from a bed to a chair (including a wheelchair)?: A Little   -   Need to walk in hospital room?: A training;Strengthening;Transfer training;Balance training    CURRENT GOALS      Goal #1  Patient is able to demonstrate supine - sit EOB @ level: modified independent---Met 1/24/17      Goal #2  Patient is able to demonstrate transfers Sit to/from Stand at

## 2017-01-24 NOTE — PLAN OF CARE
Assumed care of pt 1900. AOx4. [place] bilat hearing aids. SR/ST per tele. 's. Pt denies chest pain or pressure. 0110 pt up to BSC, 's. Pt denies chest pain or pressure, denies palpitations. Pt back to bed, -120's.  Runs of a-fib, then yumiko

## 2017-01-24 NOTE — DISCHARGE SUMMARY
General Medicine Discharge Summary     Patient ID:  Elba Delaney  8 year old  4/11/1915    Admit date: 1/19/2017    Discharge date and time:  1/24/17    Attending Physician: Conchis Miranda cards suspects may be from bradycardia and PAF respectively  - EKG noted.  Cards consulted, appreciate.  meds per cards  - CXR with no evidence of consolidation, small L pleural effusion noted  - Echo 11/2016 with EF 55-60%, mild-moderated TR  - BNP elevate intact BS  Extremities: no pedal edema   Neuro: CN inact, no focal deficits      Total time coordinating care for discharge: > 30 minutes    MD Mc Garcia MD  [facility] Hospitalist  Pager: [phone]

## 2017-01-24 NOTE — PROGRESS NOTES
[facility]  Progress Note    Betito Mcnulty Patient Status:  Observation    1915 MRN [id number]   [facility] 7NE-A Attending Preston Khan, *   Hosp Day # 5 PCP Rosario Valladares MD       Chart reviewed; patient feels OK

## 2017-01-24 NOTE — PROGRESS NOTES
[facility] [address] [place] [address] Cardiology Progress Note        Onofre Lawler Patient Status:  Inpatient    1915 MRN [id number]   [facility] 7NE-A Attending Manny Drake, [address] Day # 5 PCP MD Gail Torres fredy    EKG:      Echo:      Cardiac Cath:      Labs:  HEM:  Recent Labs   Lab  01/22/17   1201   HGB  9.8*       Chem:  Recent Labs   Lab  01/22/17   0533  01/23/17   0411   NA  139  140   K  4.0  4.3   CL  109  113*   CO2  23.0  19.0*   BUN  57*  46*

## 2017-01-24 NOTE — PLAN OF CARE
Patient alert and orientated x 3, afebrile, SpO2 >92% on RA.  NSR/ST on tele. Denies pain. IV c/d/i, flushed and saline lock. Patient up x 1 walker, BR/BSC with brief. Voiding without difficulty, no BM reported today.   Aylin Prince is setting up 24 hour

## 2017-01-24 NOTE — CM/SW NOTE
Pt is ready for d/c today. Pt will return to [facility] with [facility]. Spoke with Carolyn Rubio at [facility] [phone] to notify them that pt was being d/c'd. D/C summary sent to [facility] via ecin. They will see pt tomorrow.

## 2017-01-25 NOTE — PLAN OF CARE
Pt discharged to home  Pt and niece educated on discharge meds and follow ups  Pt denies any further questions  IV and tele d/c'd  Escorted to lobby via [facility] home

## 2017-01-25 NOTE — CM/SW NOTE
01/25/17 1000   Discharge disposition   Discharged to: Home-Health   Name of Facillity/Home Care/Hospice [facility]   Additional Home Care/Hospice Provider [address]   Discharge transportation Private car

## 2017-01-27 PROBLEM — R06.00 DYSPNEA, UNSPECIFIED TYPE: Status: ACTIVE | Noted: 2017-01-01

## 2017-01-27 PROBLEM — J90 BILATERAL PLEURAL EFFUSION: Status: ACTIVE | Noted: 2017-01-01

## 2017-01-27 PROBLEM — R06.00 DYSPNEA: Status: ACTIVE | Noted: 2017-01-01

## 2017-01-28 NOTE — H&P
[facility] Hospitalist History and Physical      Patient presents with:  Dyspnea LORE SOB (respiratory)       PCP: Roe Julio MD      History of Present Illness: Patient is a 8 year old female with PMH sig for HTN, CKD, Paroxysmal a fib chronic diastolic HF be completed on 1/28/17 ) Disp: 10 capsule Rfl: 0   DiltiaZEM HCl ER Coated Beads 240 MG Oral Capsule SR 24 Hr Take 1 capsule (240 mg total) by mouth daily.  Disp: 30 capsule Rfl: 1   torsemide 20 MG Oral Tab Take 1 tablet (20 mg total) by mouth BID (Ildat Sclera anicteric, No conjunctival pallor, EOMs intact. Nose: Nares normal. Septum midline. Mucosa normal. No drainage.    Throat: Lips, mucosa, and tongue normal. Teeth and gums normal.   Neck: Supple, symmetrical, trachea midline, no cervical or supracl COMPARISON:  [facility] , CT BRAIN OR HEAD (26680), 11/21/2016, 5:19. INDICATIONS:  Status post fall. Found on floor of her room. No reported loss of consciousness. TECHNIQUE:  Noncontrast CT scanning is performed through the brain.  Dose reduction techniques blunting of the costophrenic angles bilaterally consistent with pleural effusions with atelectasis/consolidation. No pneumothorax. Heavily calcified aortic arch.  Osseous structures are stable compared to 1/19/17 chest radiograph.      1/27/2017  CONCLUSION renal disease  -stable to prior, continue to monitor      Outpatient records or previous hospital records reviewed. [facility] hospitalist to continue to follow patient while in house  A total of 70 minutes taken with patient and coordinating care.   [facility] raya

## 2017-01-28 NOTE — ED INITIAL ASSESSMENT (HPI)
Patient arrives from home with family C/O SOB that started upon arousal this am, states she also had an episode of palpitations and took her Cardizem which resolved issues.

## 2017-01-28 NOTE — PLAN OF CARE
DISCHARGE PLANNING    • Discharge to home or other facility with appropriate resources Progressing        PAIN - ADULT    • Verbalizes/displays adequate comfort level or patient's stated pain goal Progressing        RESPIRATORY - ADULT    • Achieves optima

## 2017-01-28 NOTE — ED PROVIDER NOTES
Patient Seen in: [facility] 7ne-a    History   Patient presents with:  Dyspnea LORE SOB (respiratory)    Stated Complaint: SOB    HPI    This is 8-year-old female with past medical history of hypertension, acute renal failure, CKD, pleural effusion daily. Fluocinolone Acetonide 0.01 % External Solution,  Apply 1 Application topically daily as needed.    acetaminophen 500 MG Oral Tab,  Take 500 mg by mouth every 6 (six) hours as needed for Pain.   latanoprost 0.005 % Ophthalmic Solution,  Place 1 alexander Diminished bilaterally at the bases. HEART:  Regular rate and rhythm. S1 and S2. No murmurs, no rubs or gallops. ABDOMEN: Soft, nontender and nondistended. Normoactive bowel sounds. No rebound. No guarding.    EXTREMITIES: Warm with brisk capillary ref orders. BASIC METABOLIC PANEL (8)   CBC WITH DIFFERENTIAL WITH PLATELET   MAGNESIUM   URINE CULTURE, ROUTINE      EKG    Rate, intervals and axes as noted on EKG Report.   Rate:59  Rhythm: Sinus Rhythm  Reading: No acute changes            Xr Chest Ap Por understanding. Case discussed with admitting physicians Dr. Amy Cisneros, [facility] hospitalist and Dr. Laci Santiago pulmonary. Patient went to cardiac telemetry for further workup.     Disposition and Plan     Clinical Impression:  Dyspnea, unspecified type  (primary

## 2017-01-28 NOTE — PLAN OF CARE
Admitted pt from ED to [address] at 2330. Bumex received in ED.  2400ml urinary output during night in BSC.  2L O2 NC needed. Lungs sounds diminished with faint crackles. SR/ SB. Denies pain. Bed alarm on and call light within reach.     PAIN - ADULT    • V

## 2017-01-29 NOTE — CONSULTS
[facility]  Consultation Note      Kristen Romero Patient Status:  Inpatient    1915 MRN [id number]   [facility] 7NE-A Attending Robert Keys MD   Hosp Day # 2 PCP Donnie Grant MD     Reason for consultati Daily   Albuterol Sulfate  (90 Base) MCG/ACT inhaler 2 puff 2 puff Inhalation Q6H PRN   acetaminophen (TYLENOL EXTRA STRENGTH) tab 500 mg 500 mg Oral Q6H PRN   latanoprost (XALATAN) 0.005 % ophthalmic solution 1 drop 1 drop Both Eyes Nightly   Fluti hematuria  Hematologic/lymphatic: negative for bleeding  Musculoskeletal:negative for myalgias  Neurological: negative for dizziness and headaches  Endocrine: negative for temperature intolerance      Physical Exam:  Blood pressure 121/40, pulse 61, temper Ann Turcios MD, [facility]  1/29/2017  9:40 AM

## 2017-01-29 NOTE — PROGRESS NOTES
[facility] Hospitalist Progress Note                                                                   [address] [facility]  4/11/1915    CC: f/u dyspnea    SUBJECTIVE: States her breathing feels a b Sulfate HFA, acetaminophen, hydrALAzine HCl    Assessment/Plan:  Principal Problem:    Dyspnea, unspecified type  Active Problems:    Dyspnea    Bilateral pleural effusion      8 year old female with PMH sig for HTN, CKD, Paroxysmal a fib chronic diastol

## 2017-01-30 NOTE — PROGRESS NOTES
[address] [place] [address] Patient Status:  Inpatient    1915 MRN [id number]   [facility] 7NE-A Attending Dayana Puga MD   Hosp Day # 3 PCP Donnie Rod MD     SUBJECTIVE:Still short of breath but looks comfortable enlarged, symmetric, no tenderness/mass/nodules  Lungs: diminished breath sounds bibasilar  Heart: S1, S2 normal, no murmur, click, rub or gallop, regular rate and rhythm  Abdomen: soft, non-tender; bowel sounds normal; no masses,  no organomegaly  Extremi

## 2017-01-30 NOTE — PHYSICAL THERAPY NOTE
PHYSICAL THERAPY EVALUATION - INPATIENT     Room Number: 0170/9120-U  Evaluation Date: 1/30/2017  Type of Evaluation: Initial  Physician Order: PT Eval and Treat    Presenting Problem: CHF  Reason for Therapy: Mobility Dysfunction and Discharge Angelan mechanics;Repositioning    COGNITION  · Overall Cognitive Status:  WFL - within functional limits  · Arousal/Alertness:  appropriate responses to stimuli  · Attention Span:  appears intact  · Orientation Level:  oriented x4  · Safety Judgement:  good aware tested  Comment : above per FIM    Skilled Therapy Provided: Patient received up in chair. Reports chronic neck and shoulder pain but unable to rate. Transfers to stand with min a for balance.   Gait trained with rw with min a for balance limited by compl sit EOB @ level: modified independent     Goal #2 Patient is able to demonstrate transfers Sit to/from Stand at assistance level: supervision to rw     Goal #3 Patient is able to ambulate 150 feet with assist device: walker - rolling at assistance level: s

## 2017-01-30 NOTE — PROGRESS NOTES
[facility] Hospitalist Progress Note                                                                   [address] [facility]  4/11/1915    CC: f/u dyspnea    SUBJECTIVE: States this morning she felt s ondansetron HCl, Albuterol Sulfate HFA, acetaminophen, hydrALAzine HCl    Assessment/Plan:  Principal Problem:    Dyspnea, unspecified type  Active Problems:    Dyspnea    Bilateral pleural effusion      8 year old female with PMH sig for HTN, CKD, Parox

## 2017-01-30 NOTE — PROGRESS NOTES
[facility] Cardiology Progress Note        Rocky Call Patient Status:  Inpatient    1915 MRN [id number]   [facility] 7NE-A Attending Aldair Glez MD   Hosp Day # 3 PCP Maxwell Moncada MD     Subjective: normal  Cardiac: Regular rhythm, S1, S2 normal,  rub or gallop. No murmur. Lungs: diminished in bases. Abdomen: Soft, non-tender. Extremities: No edema  Neurologic: no focal deficits  Skin: Warm and dry.      Telemetry: sinus    EKG:      Echo:      Ca

## 2017-01-30 NOTE — PLAN OF CARE
Assumed care at 2300. Pt alert and oriented. Legally blind. O2 at 2L. Lung sounds diminished with crackles. Voiding without difficulty. Denies any pain.      CARDIOVASCULAR - ADULT    • Maintains optimal cardiac output and hemodynamic stability Progres

## 2017-01-30 NOTE — CONSULTS
[address]  [id number]  Hospital Day #3  Date of Consult: 01/30/17       Reason for Consultation: Consult requested for evaluation of palliative care needs and goals of care discussion. palliative care and hospice. Discussed services provided and philosophy of care. Provided brochures to family and they will read them to pt. Family would like to discuss options this evening.   Recommended information meeting with hospice to answer their

## 2017-01-30 NOTE — CM/SW NOTE
01/30/17 1600   CM/SW Referral Data   Referral Source    Reason for Referral Discharge planning   Informant Patient; Other  (Joe Richard)   Readmission Assessment   Factors that patient feels contributed to this readmission Other (only oswaldo HTN, CKD, fractured hip approx 1/5 years ago. Pt. Lives at [address] [place] [address]. After last hospitalization she has round the clock 24 hour supervision. Service is provided by [facility]. Pt. Is single, never .   Her

## 2017-01-31 NOTE — PROGRESS NOTES
Discussed with her POA. Livia Mosley. Discussed risks/benefits of thoracentesis.   He agrees with the procedure

## 2017-01-31 NOTE — PROGRESS NOTES
[facility] Cardiology Progress Note        Ave Span Patient Status:  Inpatient    1915 MRN [id number]   [facility] 7NE-A Attending Sharron Johnson MD   Hosp Day # 4 PCP Lindaann Riedel, MD     Subjective: (36.9 °C)  Pulse:  [54-64] 57  Resp:  [15-19] 15  BP: (105-143)/(35-48) 105/48 mmHg    General: No apparent distress  HEENT: No focal deficits. Neck: supple. JVP normal  Cardiac: Regular rhythm, S1, S2 normal,  rub or gallop. No murmur.   Lungs: diminishe

## 2017-01-31 NOTE — PALLIATIVE CARE NOTE
SW met with pt and pts skylar/Lisa and explained PC SW role; pt and pts angelinaece/Lisa verbalized understanding.  Pts skylar/Lisa requested to speak with [facility] JOSE G/Leda as that is who she spoke to yesterday 1/30/17; SW offered further assistance; pt and nimelissa

## 2017-01-31 NOTE — PLAN OF CARE
Received report at 0700. Patient alert and oriented x4. No complaints of pain. Had thoracentesis done today. 600ml removed. Family/friend at bedside. Up in chair x2 today. Patient resting comfortably. HR in low 50s; cardio aware. Will continue to monitor.

## 2017-01-31 NOTE — PALLIATIVE CARE NOTE
1800 Brennon Castellano Follow Up      Onofre Lawler  [id number]       Patient seen and evaluated, family at bedside. Pt awake and alert, sitting up in chair. Denies complaints. Gib Nose nurse at bedside.     Assessment/Recommendatio

## 2017-01-31 NOTE — PROCEDURES
[facility]  Procedure Note    Raegan Mansfield Patient Status:  Inpatient    1915 MRN [id number]   [facility] 7NE-A Attending Sarah Baugh MD   [facility] Day # 4 PCP Jostin Goel MD     Procedure: US guided right thoracentesis

## 2017-01-31 NOTE — IMAGING NOTE
US guided right thoracentesis with Dr. Yaw Moseley. 600 ml removed, specimen to lab for evaluation. Pt tolerated procedure well. Vss. Report to Emma Hawkins, RN. Transport to room 6381 post xray.

## 2017-01-31 NOTE — PROGRESS NOTES
[facility] Hospitalist Progress Note                                                                   [address] [facility]  4/11/1915    CC: f/u dyspnea    SUBJECTIVE:     Seen this AM, states her b acetaminophen, ondansetron HCl, Albuterol Sulfate HFA, acetaminophen, hydrALAzine HCl    Assessment/Plan:  Principal Problem:    Dyspnea, unspecified type  Active Problems:    Dyspnea    Bilateral pleural effusion      8 year old female with PMH sig for

## 2017-01-31 NOTE — PROGRESS NOTES
[address] [place] [address] Patient Status:  Inpatient    1915 MRN [id number]   [facility] 7NE-A Attending Quynh Wong MD   [facility] Day # 4 PCP Darinel Morocho MD     SUBJECTIVE:still feels short of breath     OBJECTIVE:  BP supple, symmetrical, trachea midline and thyroid not enlarged, symmetric, no tenderness/mass/nodules  Lungs: diminished breath sounds base - right  Heart: S1, S2 normal, no murmur, click, rub or gallop, regular rate and rhythm  Abdomen: soft, non-tender; b

## 2017-01-31 NOTE — IMAGING NOTE
Spoke to FRANCHESCA Billings. NPO effective now. PT/INR scheduled to be drawn @ 1115. Will obtain consent to have Right Thoracentesis with Dr. Chel Contreras from family.  Plan for procedure this pm.

## 2017-01-31 NOTE — PROGRESS NOTES
01/31/17 0946   Clinical Encounter Type   Visited With Patient   Routine Visit Introduction   Patient's Supportive Strategies/Resources  assisted patient to identify available support systems, cultural expressions and spiritual strengths/values.

## 2017-01-31 NOTE — PHYSICAL THERAPY NOTE
PHYSICAL THERAPY TREATMENT NOTE - INPATIENT    Room Number: 1290/5075-S     Session: 1   Number of Visits to Meet Established Goals: 5    Presenting Problem: CHF    Problem List  Principal Problem:    Dyspnea, unspecified type  Active Problems:    Dyspnea -   Moving from lying on back to sitting on the side of the bed?: A Little   How much help from another person does the patient currently need. ..   -   Moving to and from a bed to a chair (including a wheelchair)?: A Little   -   Need to walk in hospital Plan: Bed mobility; Endurance; Energy conservation;Patient education; Family education;Gait training;Transfer training;Balance training    CURRENT GOALS      Goal #1  Patient is able to demonstrate supine - sit EOB @ level: modified independent      Goal #2

## 2017-01-31 NOTE — PLAN OF CARE
Assumed care at 1900. Vital signs stable. No acute events overnight.   Alert and oriented X4  Sinus fredy on tele  Lungs diminished bilaterally, no crackles    Plan: Monitor    CARDIOVASCULAR - ADULT    • Maintains optimal cardiac output and hemodynamic st

## 2017-01-31 NOTE — PLAN OF CARE
Assumed care @ 0700  A&Ox4, VSS on RA, SB on tele  Increased bumex today  Palliative consulted and saw patient  CXR tomorrow

## 2017-02-01 NOTE — CDS QUERY
Clinical Significance – Sodium Value  CLINICAL DOCUMENTATION CLARIFICATION FORM  Dear Doctor:  Clinical information (provided below) includes documentation of sodium value that is not within the normal range.  For accurate ICD-10-CM code assignment to refle

## 2017-02-01 NOTE — PROGRESS NOTES
[facility] Hospitalist Progress Note                                                                   [address] [facility]  4/11/1915    CC: f/u dyspnea    SUBJECTIVE:   Really would like O2 for use spray Each Nare BID   • DiltiaZEM HCl ER Coated Beads  240 mg Oral Daily     Continuous Infusions:    PRN: acetaminophen, ondansetron HCl, Albuterol Sulfate HFA, acetaminophen, hydrALAzine HCl    Assessment/Plan:  Principal Problem:    Dyspnea, unspecified

## 2017-02-01 NOTE — PROGRESS NOTES
[facility] Progress Note        Betito Mcnulty Patient Status:  Inpatient    1915 MRN [id number]   [facility] 7NE-A Attending Jerardo Tilley MD   Hosp Day # 5 PCP Rosario Valladares MD     Subjective: (106-130)/(32-39) 109/34 mmHg    General: No apparent distress  HEENT: No focal deficits. Neck: supple. JVP normal  Cardiac: Regular rhythm, S1, S2 normal,  rub or gallop. No murmur. Lungs: diminished in bases. Fine crackles improved.   Abdomen: Soft, n

## 2017-02-01 NOTE — PROGRESS NOTES
Assumed care at 1900. Vital signs stable. No acute events overnight.   Alert and oriented X4  Sinus fredy on tele  Lungs diminished bilaterally, no crackles  2L oxygen    Plan: Home with palliative      CARDIOVASCULAR - ADULT      •  Maintains optimal card

## 2017-02-01 NOTE — PROGRESS NOTES
[address] [place] [address] Patient Status:  Inpatient    1915 MRN [id number]   [facility] 7NE-A Attending Dayana Puga MD   [facility] Day # 5 PCP Donnie Rod MD     SUBJECTIVE:Still shrot of breath     OBJECTIVE:  /39 symmetrical, trachea midline and thyroid not enlarged, symmetric, no tenderness/mass/nodules  Lungs: diminished breath sounds base - right  Heart: S1, S2 normal, no murmur, click, rub or gallop, regular rate and rhythm  Abdomen: soft, non-tender; bowel rosalind

## 2017-02-01 NOTE — PROGRESS NOTES
Patient on oxygen at rest 98%  Patient off oxygen at rest 100%  Patient with oxygen ambulating 100%  Patient without oxygen ambulating 96%

## 2017-02-01 NOTE — PHYSICAL THERAPY NOTE
PHYSICAL THERAPY TREATMENT NOTE - INPATIENT    Room Number: 3679/3043-N     Session: 2   Number of Visits to Meet Established Goals: 5    Presenting Problem: CHF    Problem List  Principal Problem:    Dyspnea, unspecified type  Active Problems:    Dyspnea Moving from lying on back to sitting on the side of the bed?: A Little   How much help from another person does the patient currently need. ..   -   Moving to and from a bed to a chair (including a wheelchair)?: A Little   -   Need to walk in hospital room GOALS      Goal #1  Patient is able to demonstrate supine - sit EOB @ level: modified independent      Goal #2  Patient is able to demonstrate transfers Sit to/from Stand at assistance level: supervision to rw      Goal #3  Patient is able to ambulate 150

## 2017-02-02 NOTE — PHYSICAL THERAPY NOTE
PHYSICAL THERAPY TREATMENT NOTE - INPATIENT    Room Number: 6884/7831-M     Session: 3   Number of Visits to Meet Established Goals: 5    Presenting Problem: CHF    Problem List  Principal Problem:    Dyspnea, unspecified type  Active Problems:    Dyspnea Moving from lying on back to sitting on the side of the bed?: A Little   How much help from another person does the patient currently need. ..   -   Moving to and from a bed to a chair (including a wheelchair)?: A Little   -   Need to walk in hospital room? Plan: Bed mobility; Endurance; Energy conservation;Patient education; Family education;Gait training;Transfer training;Balance training    CURRENT GOALS      Goal #1  Patient is able to demonstrate supine - sit EOB @ level: modified independent      Goal #2

## 2017-02-02 NOTE — PLAN OF CARE
Patient interested in Oxygen from home, but does not qualify. SW has given prices and patient to discuss with family. Spent a lot of time talking about Hospice and what it entails today. Patient had BM x 2 today. SOB still present.  Thought to be more cardi

## 2017-02-02 NOTE — PLAN OF CARE
AOx4. Up with assistance with walker. Pt is fredy between 40-50's. O2 sat on mid 90's. Pt still feels SOB. O2 2L applied. Right mid back dressing C/D/I. Plan of care discussed with pt. Call light within reach.

## 2017-02-02 NOTE — PROGRESS NOTES
[facility] [address] [place] [address] Cardiology Progress Note        Fabiola Wraner Patient Status:  Inpatient    1915 MRN [id number]   [facility] 7NE-A Attending Brittany Parish MD   [facility] Day # 6 PCP Lena Medina MD     Subjective: 97.9 °F (36.6 °C)  Pulse:  [47-74] 47  Resp:  [15-18] 18  BP: ()/(33-49) 101/44 mmHg    General: No apparent distress  HEENT: No focal deficits. Neck: supple. JVP normal  Cardiac: Regular rhythm, S1, S2 normal,  rub or gallop. No murmur.   Lungs: di

## 2017-02-02 NOTE — PLAN OF CARE
CARDIOVASCULAR - ADULT    • Maintains optimal cardiac output and hemodynamic stability Adequate for Discharge    • Absence of cardiac arrhythmias or at baseline Adequate for Discharge        DISCHARGE PLANNING    • Discharge to home or other facility with

## 2017-02-02 NOTE — CM/SW NOTE
[facility] resumed and home oxygen concentrator ordered via [employer] @ $ 151.50 month. Order faxed to [facility].

## 2017-02-03 NOTE — ED NOTES
Long discussion with NP's from Dr. Rylee Green office. All questions answered. Niece and caregiver with pt at time. Pt alert and asking questions well. Discharge instructions given. Discharged via wheelchair.   Used commode prior to leaving as well as

## 2017-02-03 NOTE — ED PROVIDER NOTES
Patient Seen in: [facility] Emergency Department    History   Patient presents with:  Pain (neurologic)    Stated Complaint: left arm upper back pain    HPI    Patient is a 80-year-old female who presents emergency room with a history of just recen Propionate 50 MCG/ACT Nasal Suspension,  2 sprays by Each Nare route 2 (two) times daily. torsemide 20 MG Oral Tab,  Take 1 tablet (20 mg total) by mouth BID (Diuretic). Multiple Vitamins-Minerals (EYE VITAMINS) Oral Tab,  Take by mouth.    Glucosamine- Resp 23  Ht 152.4 cm (5')  Wt 49.896 kg  BMI 21.48 kg/m2  SpO2 99%        Physical Exam    GENERAL: Well-developed, well-nourished female  Sitting up breathing easily in no apparent distress. Patient is nontoxic in appearance.   HEENT: Head is normocephali Absolute Prelim 6.73 (*)     Neutrophil Absolute 6.73 (*)     Monocyte Absolute 1.13 (*)     All other components within normal limits   PTT, ACTIVATED - Normal    Narrative: The aPTT Heparin Therapeutic Range is approximately 65- 104 seconds.  The ther spine. Patient had an IV line established and have blood work drawn including CBC, chemistries, BUN and creatinine, and blood sugar all of which are  unremarkable except for sodium of 127 and a creatinine of 1.9 which is up from previous.   Patient's t

## 2017-02-03 NOTE — CONSULTS
[facility] Cardiology Consultation    Carry Dancer Patient Status:  Emergency    1915 MRN [id number]   Location [address] [facility] Attending Jimena Rowland MD   Hosp Day # 0 PCP Arturo Lao MD     Reason for Consultation:  Per tobacco. She reports that she does not drink alcohol or use illicit drugs. Review of Systems:  All systems were reviewed and are negative except as described above in HPI.     Physical Exam:      Temp:  [98.2 °F (36.8 °C)] 98.2 °F (36.8 °C)  Pulse:  [43- dry at present  4. Pleural effusions - previous thoracenteses, last 1/31.  5. SSS with sinus syndrome and PAF. 6. Chronically elevated troponin  7. Acute on CRI - dry  8. [place]/legally blind    Plan:  Long discussion again with Ms. Javier Richardson, niece, and careg

## 2017-02-03 NOTE — PLAN OF CARE
NURSING DISCHARGE NOTE    Discharged Home via Wheelchair. Accompanied by Support staff  Belongings Taken by patient/family. Discharge instructions given to patient, caregiver, and niece. All questions answered.

## 2017-02-03 NOTE — ED INITIAL ASSESSMENT (HPI)
Left shoulder pain that radiates to left arm and left neck started at 11:30 am this morning. Denies injury. Uses O2 at home at 2lpm nc.

## 2017-02-03 NOTE — ED NOTES
Nurse Practitioners from Dr. Adi Mills office here to discuss home med regimen with pt/caregiver and niece.

## 2017-02-03 NOTE — PLAN OF CARE
Patient has blanchable redness to bottom. We have been diuresing her and she is incontinent with a brief. Applying aloe vesta and discussed with caregiver on continuing and answered questions.

## 2017-02-03 NOTE — CM/SW NOTE
02/03/17 0900   Discharge disposition   Discharged to: Home-Health   Name of Facillity/Home Care/Hospice [facility]   Additional Home Care/Hospice Provider ([address]   Home services after discharge DME   HME provider [facility]   Disc

## 2017-02-04 NOTE — DISCHARGE SUMMARY
[facility] Internal Medicine Discharge Summary    Patient ID:  See Encarnacion  [id number]  89 year old  4/11/1915    Admit date: 1/27/2017  Discharge date and time: 2/2/16  Attending Physician: Sarai Malave MD  Primary Care Physician: Joshua Padilla MD continue to monitor    Leukocytosis- resolved  -Possibly reactive    Day of discharge Exam   02/02/17  1300   BP: 98/34   Pulse: 47   Temp: 97.8 °F (36.6 °C)   Resp: 18     Exam on day of discharge:  Gen: No acute distress, alert and oriented  CV: RRR, +s1 thoracentesis with marked reduction in the amount of right pleural fluid. The small right pleural effusion remains. No pneumothorax. Small left effusion is unchanged. Substantially improved interstitial edema. Continued cardiomegaly.       1/31/2017  CONCLU (CPT=72050)  TECHNIQUE:  AP, lateral, obliques, and coned down view of the spine were obtained. COMPARISON:  None. INDICATIONS:  left arm upper back pain  PATIENT STATED HISTORY:  Patient complains of cervical pain radiating down left arm for four hours. arachnoid cyst in the right middle cranial fossa which is stable. There is nothing suspicious for an acute territorial infarct. SINUSES:           No sign of acute sinusitis. MASTOIDS:          No sign of acute inflammation.  SKULL:             No evidence costophrenic angles bilaterally consistent with pleural effusions with atelectasis/consolidation. No pneumothorax. Heavily calcified aortic arch. Osseous structures are stable compared to 1/19/17 chest radiograph.      1/27/2017  CONCLUSION:  1.  Compared t LOCATION:  Right hemithorax FLUID REMOVED:  600 cc of clear yellow fluid MEDICATION:  1% lidocaine COMPLICATIONS:  None. LABORATORY:  Fluid sent for analysis as ordered.       1/31/2017  CONCLUSION:  Ultrasound-guided right thoracentesis of 600 cc was perfo

## 2017-02-07 PROBLEM — R07.9 ACUTE CHEST PAIN: Status: ACTIVE | Noted: 2017-01-01

## 2017-02-07 PROBLEM — R77.8 ELEVATED TROPONIN: Status: ACTIVE | Noted: 2017-01-01

## 2017-02-07 NOTE — CONSULTS
[facility]  Report of Consultation    Fabiola Warner Patient Status:  Emergency    1915 MRN [id number]   Location 656 [facility] Attending Elieser Cavazos MD   Hosp Day # 0 PCP Lena Medina MD     Reason Medications:   Scheduled:   • ED Initial dose Heparin infusion  12 Units/kg/hr Intravenous Once       Continuous Infusion:        PRN Medications:       Outpatient Medications:     No current facility-administered medications on file prior to enc fevers, chills, change in weight or bowel habits. Ten point review of systems is otherwise negative or unremarkable.     Physical Exam:    02/07/17  1404   BP: 131/63   Pulse: 84   Temp:    Resp: 24     Wt Readings from Last 3 Encounters:  02/07/17 : 110 l ventricle: The cavity size was normal. Wall thickness was normal.     Systolic function was normal. The estimated ejection fraction was 55-60%.    The study is not technically sufficient to allow evaluation of LV     diastolic function.   2. Aortic valve:

## 2017-02-07 NOTE — PLAN OF CARE
Patient/Family Goals    • Patient/Family Long Term Goal Progressing    • Patient/Family Short Term Goal Progressing        Patient is alert and oriented x4. NSR on tele. Oxygenation is 98% on RA. VS stable.  Patient has been known to fredy down into 30s-40s

## 2017-02-07 NOTE — H&P
[facility] Hospitalist History and Physical      CC: Shoulder pain     PCP: Nils Ingram MD      History of Present Illness: Patient is a 8 year old female with PMH sig for HTN, CKD, Paroxysmal a fib chronic diastolic HF who presented to ED with left-sided sh afternoon. Disp: 60 tablet Rfl: 3   Fluticasone Propionate 50 MCG/ACT Nasal Suspension 2 sprays by Each Nare route 2 (two) times daily. Disp: 1 Bottle Rfl: 0   Multiple Vitamins-Minerals (EYE VITAMINS) Oral Tab Take 1 tablet by mouth.    Disp:  Rfl:    Gluc midline. Mucosa normal. No drainage.    Throat: Lips, mucosa, and tongue normal. Teeth and gums normal.   Neck: Supple, symmetrical, trachea midline, no cervical or supraclavicular lymph adenopathy, thyroid: no enlargment/tenderness/nodules appreciated   Nikky fluid. No pneumothorax. Stable left pleural effusion. Substantial improvement in interstitial edema.     Dictated by: Luis Duval MD on 1/31/2017 at 14:27     Approved by: Luis Duval MD            Xr Chest Pa + Lat Chest (cpt=71020)    2/3/2017 cervical lordosis. There is ankylosis across the vertebral bodies and posterior elements of C3-C4. Prevertebral soft tissues are within normal limits. There is mild to moderate disc height loss at C4-C5 and C5-C6.  No acute displaced osseous fracture is see cataract surgery changes. 1/22/2017  CONCLUSION:  Diffuse cerebral and cerebellar atrophy and chronic microvascular ischemic changes. No acute intracranial hemorrhage, mass effect or midline shift.     Dictated by: Joice Boxer, MD on 1/22/2017 at AP chest radiograph was obtained. COMPARISON:  EDMARIA LUISA , XR CHEST AP PORTABLE  (CPT=71010), 1/19/2017, 16:47.   INDICATIONS:  SOB  PATIENT STATED HISTORY:   Patient arrives from home with family C/O SOB that started upon arousal this am, states she also ha BUBBA) (CPT=71010), 1/31/2017, 14:06. INDICATIONS:  pleural effusion dyspnea  DESCRIPTION OF PROCEDURE:  Informed consent was obtained. Time out was performed by the staff. The patient was prepped and draped in the standard sterile fashion.   An ultrasound face encounter.       Sundeep Arango MD  [facility] Hospitalist  [phone]    **Certification        PHYSICIAN Certification of Need for Inpatient Hospitalization - Initial Certification      Patient will require inpatient services that will reasonably be expect

## 2017-02-07 NOTE — ED PROVIDER NOTES
Patient Seen in: [facility] Emergency Department    History   Patient presents with:  Chest Pain Angina (cardiovascular)    Stated Complaint: CHEST PAIN    HPI    8-year-old female presents emergency department who states last night started having Place 1 drop into both eyes nightly. Albuterol Sulfate  (90 BASE) MCG/ACT Inhalation Aero Soln,  Inhale 2 puffs into the lungs every 6 (six) hours as needed for Wheezing or Shortness of Breath.    Timolol Maleate 0.5 % Ophthalmic Solution,  Place 1 hepatosplenomegaly bowel sounds are present , no pulsatile mass  Extremities: No clubbing cyanosis or edema 2+ distal pulses. Neuro: Cranial nerves II through XII intact with no gross focal sensory or motor abnormality.     ED Course     Labs Reviewed   CO pain-free currently. Patient will be admitted I did discuss case with cardiology. Will start her on heparin. Patient remained comfortable while in the ER. Did have a couple episodes of some mild bradycardia but did seem to be sinus bradycardia.   She

## 2017-02-08 PROBLEM — Z71.89 COUNSELING REGARDING GOALS OF CARE: Status: ACTIVE | Noted: 2017-01-01

## 2017-02-08 PROBLEM — Z51.5 PALLIATIVE CARE ENCOUNTER: Status: ACTIVE | Noted: 2017-01-01

## 2017-02-08 NOTE — PLAN OF CARE
Patient/Family Goals    • Patient/Family Long Term Goal- to go home Progressing    • Patient/Family Short Term Goal- to be free of pain Progressing          Patient is A&Ox4, in NSR maintaining 96% spo2 on room air.  Patient denies chest pain when asked but

## 2017-02-08 NOTE — PROGRESS NOTES
[facility] Hospitalist Progress Note                                                                   [address] [facility]  4/11/1915    CC: F/U left shoulder pain    SUBJECTIVE:    Patient had recu sulfate, PEG 3350, bisacodyl, ondansetron HCl    Assessment/Plan:  Principal Problem:    Acute chest pain  Active Problems:    Elevated troponin    8 year old female with PMH sig for HTN, CKD, Paroxysmal a fib chronic diastolic HF who presented to ED wit

## 2017-02-08 NOTE — PROGRESS NOTES
[facility] [address] [facility] Cardiology Progress Note        Rosanneleonidas Pro Patient Status:  Inpatient    1915 MRN [id number]   [facility] 8NE-A Attending Nhan Tate MD   Hosp Day # 1 PCP Esther Esteban MD     Subjective: distress  HEENT: No focal deficits. Neck: supple. JVP normal  Cardiac: Regular rhythm, S1, S2 normal,  rub or gallop. No murmur. Lungs: fine crackles in bases. Abdomen: Soft, non-tender.    Extremities: No edema  Neurologic: no focal deficits  Skin: War

## 2017-02-08 NOTE — CONSULTS
[address]  [id number]  Hospital Day #1  Date of Consult: 02/08/17       Reason for Consultation: Consult requested for evaluation of palliative care needs and goals of care discussion. and niece (also discussed order for palliative care with patient's HCPOA/great-nephew via phone). Introduced palliative care. Reviewed patient's current and chronic medical conditions in relation to palliative care.   When discussing GOC, patient states s declined Spiritual Care    Disposition: home    A total of 60 minutes were spent on this consult; >50% was spent counseling and coordinating care. Thank you for allowing the Palliative Care Team to participate in the care of your patient.   We will nisa

## 2017-02-08 NOTE — HOME CARE LIAISON
Received referral for [facility]. Met with patient who is agreeable to [facility]. Agency brochure provided to patient. Referral sent to [facility] via [facility]. [facility] has accepted pt and will provide skilled nurse and physical therapy.        Thank you for this r

## 2017-02-09 NOTE — PROGRESS NOTES
[facility] [place] [address] Cardiology Progress Note        Theodore Steele Patient Status:  Inpatient    1915 MRN [id number]   [facility] 8NE-A Attending Kenneth Carroll MD   Hosp Day # 2 PCP Jose Elias Goodrich MD     Subjective: gallop. No murmur. Lungs: fine crackles in bases. Abdomen: Soft, non-tender. Extremities: No edema  Neurologic: no focal deficits  Skin: Warm and dry.      Telemetry: sinus    EKG:      Echo:      Cardiac Cath:      Labs:  HEM:  Recent Labs   Lab  02/0

## 2017-02-09 NOTE — PROGRESS NOTES
[facility] Hospitalist Progress Note                                                                   [address] [facility]  4/11/1915    CC: F/U left shoulder pain    SUBJECTIVE:    Denies any chest morphINE sulfate, PEG 3350, bisacodyl, ondansetron HCl    Assessment/Plan:  Principal Problem:    Acute chest pain  Active Problems:    Elevated troponin    Palliative care encounter    Counseling regarding goals of care    8 year old female with PMH sig

## 2017-02-09 NOTE — PLAN OF CARE
CARDIOVASCULAR - ADULT    • Maintains optimal cardiac output and hemodynamic stability Progressing    • Absence of cardiac arrhythmias or at baseline Progressing    No arrhythmias today. Medically treat. Patient has been angina free.  Tylenol for pain this

## 2017-02-09 NOTE — PLAN OF CARE
Patient stated she had chest pain 10/10 stating \"it is the same pain that brought me in to the ER\" \"I am feeling sweaty\". EKG performed and changes in V2 indicating ischemia. May Ramirez made aware. Iv morphine total 8 mg given.  Nitro gtt was started

## 2017-02-09 NOTE — PHYSICAL THERAPY NOTE
PHYSICAL THERAPY QUICK EVALUATION - INPATIENT    Room Number: 2936/0969-J  Evaluation Date: 2/9/2017  Presenting Problem: NSTEMI  Physician Order: PT Eval and Treat    Problem List  Principal Problem:    Acute chest pain  Active Problems:    Elevated tropo Sensation           Sensation: intact       AM-PAC '6-Clicks' INPATIENT SHORT FORM - BASIC MOBILITY  How much difficulty does the patient currently have. ..  -   Turning over in bed (including adjusting bedclothes, sheets and blankets)?: A Little   -   Sitt able to perform transfers and amb with rw with cga.    Patient currently does not meet criteria for skilled inpatient physical therapy services, however patient will remain on Inpatient Mobility Team and will continue with daily amb with rw to maintain curr

## 2017-02-09 NOTE — CM/SW NOTE
02/09/17 1700   CM/SW Referral Data   Referral Source Social Work (self-referral)   Reason for Referral Readmission   Informant Patient   Readmission Assessment   Factors that patient feels contributed to this readmission Other (only choose if nothing e

## 2017-02-10 NOTE — PALLIATIVE CARE NOTE
[facility] [facility] Follow Up      Jaja Screen  [id number]       Assessment/Recommendations:  Prognosis/Goals of Care: Spoke with patient's HCPOA via phone. HCPOA is not available to meet until late this evening.   [facility]

## 2017-02-10 NOTE — PALLIATIVE CARE NOTE
[facility] Work Follow Up      Comments: PCSW advised by APN/Marisol that she is currently waiting to hear back from pts POA to plan a time to meet to discuss plan of care; PCSW waiting per the above to proceed with discha

## 2017-02-10 NOTE — PROGRESS NOTES
Patient presented sitting upright in bedside chair; VSS and agreeable to participate. Transferred sit>stand w/supervision assist.  Ambulated 200' w/RW and SBA. Upon completion, patient made comfortable in [facility] with call light in reach.   RN Tula Krabbe aware o

## 2017-02-10 NOTE — PROGRESS NOTES
[facility] Cardiology Progress Note        Sukhjinder Bartholomew Patient Status:  Inpatient    1915 MRN [id number]   [facility] 8NE-A Attending Carin Franco MD   Hosp Day # 3 PCP Roe Julio MD     Subjective: °C)  Pulse:  [50-82] 74  Resp:  [18-20] 18  BP: ()/(33-74) 121/59 mmHg    General: No apparent distress  HEENT: No focal deficits. Neck: supple. JVP normal  Cardiac: Regular rhythm, S1, S2 normal,  rub or gallop. No murmur.   Lungs: fine crackles in

## 2017-02-11 NOTE — PLAN OF CARE
PAIN - ADULT    • Verbalizes/displays adequate comfort level or patient's stated pain goal Not Progressing        Patient/Family Goals    • Patient/Family Short Term Goal Not Progressing          CARDIOVASCULAR - ADULT    • Maintains optimal cardiac output

## 2017-02-11 NOTE — PROGRESS NOTES
[facility] Hospitalist Progress Note                                                                   [address] [facility]  4/11/1915    CC: FU chest pain    Interval History:  - Pain overnight in t and dry  Psych: AAO x 3, with appropriate affect    Pertinent Labs:   Recent Labs   Lab  02/09/17   2011  02/10/17   0649  02/11/17   0421   RBC  3.02*  2.98*   --    HGB  9.2*  9.5*   --    HCT  29.0*  28.2*   --    MCV  96.0  94.6   --    MCH  30.5  31. 9 setting    Thrombocytosis  -Unclear etiology, downtrending  -may be reactive    Macular degeneration/glaucoma  -continue eye drops    Dispo:   -palliative consulted given advanced age and co-morbidities to re-address hospice.   -last discussion on 2/10 fam

## 2017-02-11 NOTE — PROGRESS NOTES
[facility] Cardiology Progress Note        Jak Vivar Patient Status:  Inpatient    1915 MRN [id number]   [facility] 8NE-A Attending Kali Qureshi MD   [address] Day # 4 PCP Yomi Chow MD     Subjective: Physical Exam:    Temp:  [98.1 °F (36.7 °C)-98.5 °F (36.9 °C)] 98.5 °F (36.9 °C)  Pulse:  [75-85] 81  Resp:  [18-20] 18  BP: ()/(35-60) 112/38 mmHg    General: No apparent distress  HEENT: No focal deficits. Neck: supple.  JVP normal  Cardiac:

## 2017-02-11 NOTE — PLAN OF CARE
Pt. Is alert and oriented times three. Up in chair eating breakfast slowly. NTG and heparin DC'd as per Dr. López Burton. She is sinus rhythm on monitor with 1 degree AVB and BBB. Lungs clear on auscultation.  She is free of chest pain this AM.

## 2017-02-12 NOTE — PROGRESS NOTES
[facility] Cardiology Progress Note        Jefeaaliyah Birmingham Patient Status:  Inpatient    1915 MRN [id number]   [facility] 8NE-A Attending Forest Bolaños MD   Hosp Day # 5 PCP Tyrell Mora MD     Subjective: °C)] 98.1 °F (36.7 °C)  Pulse:  [77-94] 94  Resp:  [16-18] 17  BP: (111-144)/(47-64) 128/49 mmHg    General: No apparent distress  HEENT: No focal deficits. Neck: supple. JVP normal  Cardiac: Regular rhythm, S1, S2 normal,  rub or gallop. No murmur.   Labette Sark

## 2017-02-12 NOTE — PLAN OF CARE
Pt. Is alert and oriented times four. Mimi Hernandez was in to see and said to stop Ntg which was done. Lungs clear on ausculation. She is sinus rhythm on monitor. Up with one assist and walker.

## 2017-02-12 NOTE — PROGRESS NOTES
[facility] Hospitalist Progress Note                                                                   [address] [facility]  4/11/1915    CC: BLANKA CP    Interval History:  - Feels well this AM, no CP l bilaterally, normal respiratory effort  CV: Heart with regular rate and rhythm, no murmur.  Normal PMI.     GI: Abdomen soft, nontender, nondistended, no organomegaly, bowel sounds present  Ext: Trace LE edema  Skin: Skin warm and dry  Psych: AAO x 3, with fib  -Continue torsemide  -metoprolol per cadiology  -hold CCB due to bradycardia    Left shoulder pain- resolved, now with knee/ankle pain  - Tramadol PRN, lidocaine jelly OK too    CKD  -Baseline Cr approx 1.5  -CTM      Hyponatremia  -mild, Na 133 on ad

## 2017-02-13 NOTE — PROGRESS NOTES
[facility] Hospitalist Progress Note                                                                   [address] [facility]  4/11/1915    CC: BLANKA CP    Interval History:  - fatigue  - no chest pain  - RBC   --   2.91*   HGB   --   9.0*   HCT   --   26.8*   MCV   --   92.1   MCH   --   30.9   MCHC   --   33.6   RDW   --   12.9   WBC   --   13.4*   PLT  431.0  421.0     Recent Labs   Lab  02/11/17   0421   GLU  111*   BUN  50*   CREATSERUM  1.41*   CA interest again in possible hospice care at D/C- they will meet with palliative service today

## 2017-02-13 NOTE — PALLIATIVE CARE NOTE
[address] [facility] Follow Up      Tristan Loud  [id number]       Patient seen and evaluated, no family at bedside. Patient reports being fatigued. Patient falls asleep easily during our conversation.   Reported chest pain over t

## 2017-02-13 NOTE — PROGRESS NOTES
[facility] Cardiology Progress Note        Teofilo Flores Patient Status:  Inpatient    1915 MRN [id number]   [facility] 8NE-A Attending Cornelius Cedillo MD   [facility] Day # 6 PCP Yang Hastings MD     Subjective: Exam:    Temp:  [98 °F (36.7 °C)-98.5 °F (36.9 °C)] 98.3 °F (36.8 °C)  Pulse:  [81-93] 93  Resp:  [18] 18  BP: ()/(45-63) 126/63 mmHg    General: No apparent distress  HEENT: No focal deficits. Neck: supple.  JVP normal  Cardiac: Regular rhythm, S1,

## 2017-02-13 NOTE — PLAN OF CARE
CARDIOVASCULAR - ADULT    • Maintains optimal cardiac output and hemodynamic stability  Tele shows SR with BBB. Hr- 70's.   Progressing    • Absence of cardiac arrhythmias or at baseline  No dysrythmias Progressing        DISCHARGE PLANNING    • Discharge t

## 2017-02-14 NOTE — PROGRESS NOTES
[facility] Cardiology Progress Note        Carry Dancer Patient Status:  Inpatient    1915 MRN [id number]   [facility] 8NE-A Attending Apolinar Manjarrez MD   [facility] Day # 7 PCP Arturo Lao MD     Subjective: kg)  01/27/17 2005 : 118 lb (53.524 kg)           Physical Exam:    Temp:  [98 °F (36.7 °C)-98.7 °F (37.1 °C)] 98 °F (36.7 °C)  Pulse:  [76-94] 90  Resp:  [18] 18  BP: ()/(40-53) 124/53 mmHg    General: No apparent distress  HEENT: No focal deficits.

## 2017-02-14 NOTE — CONSULTS
[facility]  Report of Consultation    Nisa Panchal Patient Status:  Inpatient    1915 MRN [id number]   [facility] 8NE-A Attending Iza Mares MD   Hosp Day # 7 PCP Gila Oh MD     Reason for Consultation:  Hyponatremia Medications:    Current facility-administered medications:   •  0.9%  NaCl infusion, , Intravenous, Continuous  •  magnesium hydroxide (MILK OF MAGNESIA) 400 MG/5ML suspension 30 mL, 30 mL, Oral, Daily PRN  •  lidocaine (LIDODERM) 5 % 1 patch, 1 patch, Tra Medications:    No current outpatient prescriptions on file. Review of Systems:  See HPI; A total of 12 systems reviewed and otherwise unremarkable.     Physical Exam:  Vital signs: Blood pressure 120/53, pulse 90, temperature 98.3 °F (36.8 °C), temperat ----------      Imaging:  Reviewed    Impression:  1. NSTEMI - medical management; per cardiology - on lovenox/BB/imdur/Plavix  2. CKD - related to age/HTN; Cr stable. - monitor  3.  Hyponatremia - suspect related to poor solute intake  - agree with sali

## 2017-02-14 NOTE — PROGRESS NOTES
Patient politely declined restorative gait training/ambulation citing 8/10 chest pain. \"I wish I could, but the pain in my chest is too bad. \"  Will re-attempt as able. FRANCHESCA Copeland Mediate aware.

## 2017-02-14 NOTE — PLAN OF CARE
CARDIOVASCULAR - ADULT    • Maintains optimal cardiac output and hemodynamic stability Progressing    • Absence of cardiac arrhythmias or at baseline Progressing        DISCHARGE PLANNING    • Discharge to home or other facility with appropriate resources on pillows per patient request.  Daily weight, I/O, 1800 ml FR. I/O. Up TID as tolerated. Will continue to monitor. 9702: patient called this RN into room stating she had 7/10 left shoulder and arm pain. This is the same pain that brought pt to ED.  Manuel Barr

## 2017-02-14 NOTE — PALLIATIVE CARE NOTE
[facility] [facility] Follow Up      Tristan Loud  [id number]       Patient seen and evaluated, no family at bedside. Patient developed chest pain/left arm pain at 0545 this AM.  According to patient, pain has not subsided.   CT ch

## 2017-02-14 NOTE — PROGRESS NOTES
[facility] Note:  Renal Adjustment for Zosyn (piperacillin/tazobactam)    Elba Delaney is a 8 year old female who has been prescribed Zosyn (piperacillin/tazobactam) 3.375 g IVPB every 8 hrs.   CrCl is estimated creatinine clearance is 14.2 mL/min

## 2017-02-14 NOTE — PLAN OF CARE
Pt admit w/ NSTEMI, medical management per cards. Pt A&O. Legally blind/ [place]'.  VSS. On RA- O2 sats low 90s. Pt c/o constant severe pain in L chest below breast. Pain worse w/ inspiration, movement, and palpation.  Llidocaine patch, lidocaine jelly, warm

## 2017-02-14 NOTE — PROGRESS NOTES
[facility] Hospitalist Progress Note                                                                   [address] [facility]  4/11/1915    CC: BLANKA VELAZCO    Interval History:  - c/o left sided chest pain w nondistended, no organomegaly, bowel sounds present  Ext: right ankle w/o erythema or edema  Skin: Skin warm and dry  Psych: AAO x 3, with appropriate affect    Pertinent Labs:   Recent Labs   Lab  02/12/17   0443  02/14/17   0825   RBC  2.91*  2.85*   HGB given advanced age and co-morbidities, pt declined hospice  -having more pain this AM, monitor today

## 2017-02-14 NOTE — CM/SW NOTE
Complex Care Assessment    Onofre Lawler Patient Status:  Inpatient   Age/Gender 8 year old female MRN [id number]   [facility] 8NE-A Attending Steve Mares MD   Hosp Day # 7 PCP Hayden Graham MD     Assessment Type: Initial    Criter

## 2017-02-15 NOTE — PALLIATIVE CARE NOTE
[facility] Work Follow Up    Discussion:  PCSW sent a referral to [facility] to set up a informational meeting with pts MAHNAZ/Edson [phone]  per APN/Bevs request. PCSW to f/u on 2/16/17.     Avelina Marquis LCSW

## 2017-02-15 NOTE — PROGRESS NOTES
[facility] Hospitalist Progress Note                                                                   [address] [facility]  4/11/1915    CC: BLANKA CP    Interval History:  - pt started on abx after CXR bilaterally, normal respiratory effort  CV: Heart with regular rate and rhythm, no murmur   GI: Abdomen soft, nontender, nondistended, no organomegaly, bowel sounds present  Ext: joint w/o erythema  Skin: Skin warm and dry  Psych: AAO x 3, with appropriate hold off    Nikolas on CKD  -Baseline Cr approx 1.5  -current Cr 1.8  -did receive IV contrast yesterday  -neurology on consult, cont to monitor     Hyponatremia  -Na 125 this AM  -renal consult  -IVF per renal    Elevated alk phos  -155 on admit, has been not

## 2017-02-15 NOTE — PROGRESS NOTES
[facility] Cardiology Progress Note        Tempie Shaunnavius Patient Status:  Inpatient    1915 MRN [id number]   [facility] 8NE-A Attending Chuy Connors MD   [facility] Day # 8 PCP Donnie Rod MD     Subjective: kg)  01/27/17 2320 : 117 lb 1 oz (53.1 kg)  01/27/17 2005 : 118 lb (53.524 kg)           Physical Exam:    Temp:  [97.5 °F (36.4 °C)-98.6 °F (37 °C)] 97.6 °F (36.4 °C)  Pulse:  [59-86] 59  Resp:  [18] 18  BP: ()/(32-58) 90/32 mmHg    General: No appa being less mobile and her advanced age. Should we try prednisone? Continue saline.               Alicia Smith

## 2017-02-15 NOTE — PROGRESS NOTES
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 Final decision pending. Thank you for allowing me to participate in this patient's care.   Please feel free to call me

## 2017-02-15 NOTE — PLAN OF CARE
Pt admit w/ STEMI- medical management per cardiology. CT yesterday showed PNA- on IV abx. DNR. Pt A&O, more drowsy today and uncomfortable. Pt still c/o pain- pain today is in hips and buttoxx.  Non- pharmacologic management for pain d/t hypotension/ bra

## 2017-02-15 NOTE — PALLIATIVE CARE NOTE
[facility] [facility] Follow Up      Larisa Castrejon  [id number]       Patient seen and evaluated, no family at bedside. Updated HCPOA via phone regarding observations and patient comments. Patient is now being treated for PNA.   Echo Care

## 2017-02-16 NOTE — PLAN OF CARE
Problem: Patient/Family Goals  Goal: Patient/Family Long Term Goal  Patient’s Long Term Goal: Patient will return home with activity and health at baseline.     Interventions:  - Assess patient needs, administer medications, monitor vital signs, and educate patient/family in tolerated activity level and precautions   Outcome: Progressing    Problem: PAIN - ADULT  Goal: Verbalizes/displays adequate comfort level or patient’s stated pain goal  INTERVENTIONS:  - Encourage pt to monitor pain and request assistanc difficulty  - Respiratory Therapy support as indicated  - Manage/alleviate anxiety  - Monitor for signs/symptoms of CO2 retention   Outcome: Progressing    Problem: METABOLIC/FLUID AND ELECTROLYTES - ADULT  Goal: Electrolytes maintained within normal limit resources and transportation as appropriate  - Identify discharge learning needs (meds, wound care, etc)  - Arrange for interpreters to assist at discharge as needed  - Consider post-discharge preferences of patient/family/discharge partner  - Complete JORGE

## 2017-02-16 NOTE — PROGRESS NOTES
[facility] Cardiology Progress Note        Fabiola Warner Patient Status:  Inpatient    1915 MRN [id number]   [facility] 8NE-A Attending Shaggy Mcduffie MD   [facility] Day # 9 PCP Lena Medina MD     Subjective: kg)  01/27/17 2005 : 118 lb (53.524 kg)           Physical Exam:    Temp:  [97.4 °F (36.3 °C)-97.8 °F (36.6 °C)] 97.7 °F (36.5 °C)  Pulse:  [41-58] 46  Resp:  [16-18] 18  BP: (73-93)/(29-41) 90/39 mmHg    General: No apparent distress  HEENT: No focal defi saline today. Hopefully she can go to hospice today where she can be comfortable. Pain control remains her major concern.               Arpit Urias

## 2017-02-16 NOTE — PLAN OF CARE
Pt has not voided since 1830 - PCT bladder scanned pt - 176cc revealed during bladder scan. Will continue to monitor.

## 2017-02-16 NOTE — PLAN OF CARE
DX: CP - STEMI - was on Heparin & NTG gtt - both have since been dc'd - [facility] to meet with pt & family 2/16    Data:  Pt was sitting in chair. Pt legally blind. Ambulated pt from chair to bed. Pt c/o chronic bilateral shoulder discomfort.   Pt

## 2017-02-16 NOTE — PLAN OF CARE
2059: Paged Dr. Zuleima Cruz re: B/P 73/32 - pt asymptomatic    2110: New orders rc'd to give pt 250cc bolus now & then may repeat x 1 if SBP < 85 - if SBP < 85 after 2 boluses - call Dr. Zuleima Cruz back. Orders initiated. See VS & EMAR for further details.

## 2017-02-16 NOTE — PLAN OF CARE
Pt has not voided since 1830 - assisted pt to [facility] - pt had no results. PCT bladder scanned pt = 164cc. Will continue to monitor & bladder scan again within 4-6hours if no urine output per urinary retention protocol.

## 2017-02-16 NOTE — PROGRESS NOTES
[facility]  Progress Note    Ave Span Patient Status:  Inpatient    1915 MRN [id number]   [facility] 8NE-A Attending Sai Mares, [address] [facility] Day # 9 PCP Lindaann Riedel, MD     Patient has decided for hospice care.       Isela Nick morphINE sulfate (PF) 4 MG/ML injection 4 mg 4 mg Intravenous Q2H PRN   docusate sodium (COLACE) cap 100 mg 100 mg Oral BID   PEG 3350 (MIRALAX) powder packet 17 g 17 g Oral Daily PRN   bisacodyl (DULCOLAX) rectal suppository 10 mg 10 mg Rectal Daily PRN to participate in this patient's care. Please feel free to call me with any questions or concerns.     Calixto Paulino MD  2/16/2017

## 2017-02-16 NOTE — PLAN OF CARE
Pt admit w/ STEMI- developed PNA. Kidney function worse today. Pt A&O, drowsy and looks exhausted. Pt states she is uncomfortable, no appetite, states she feels \"helpless\".   Pt requested that her niece come in immediately this am.  On 2L NC per pt req

## 2017-02-16 NOTE — PALLIATIVE CARE NOTE
1808 Brennon Castellano Follow Up      Teofilo Flores [id number]       Patient seen and evaluated, family (friend) at bedside. Patient appears weak - receiving tylenol for c/o pain. Hospice evaluation later today.   Remains in juncti

## 2017-02-17 NOTE — PROGRESS NOTES
[facility] Hospitalist Progress Note                                                                   [address] [facility]  4/11/1915    CC: BLANKA CP    Interval History:  - worsening renal function  - Labs   Lab  02/14/17   0825  02/15/17   0405   RBC  2.85*  2.86*   HGB  8.7*  8.9*   HCT  26.9*  27.2*   MCV  94.4  95.1   MCH  30.5  31.1   MCHC  32.3  32.7   RDW  12.9  13.0   NEPRELIM  8.95*   --    WBC  13.8*  19.0*   PLT  409.0  381.0     Recent Labs drops    Dispo:    -meeting w/ hospice today    Discussed w/ pt and nephew at bedside. Pt is hospice appropriate.   I agree w/ Dr. Jayne Mann that her body is shutting down - she's bradycardic, more hypotensive than her usual, worsening renal failure, oligu

## 2017-02-17 NOTE — PROGRESS NOTES
[facility]  Progress Note    Onofre Lawler Patient Status:  Inpatient    1915 MRN [id number]   [facility] 8NE-A Attending Steve Mares MD   Hosp Day # 8 PCP Hayden Graham MD     Chart reviewed; patient's family has declined h morphINE sulfate (PF) 2 MG/ML injection 2 mg 2 mg Intravenous Q2H PRN   Or      morphINE sulfate (PF) 4 MG/ML injection 4 mg 4 mg Intravenous Q2H PRN   docusate sodium (COLACE) cap 100 mg 100 mg Oral BID   PEG 3350 (MIRALAX) powder packet 17 g 17 g Oral concerns.     Shamar Brooks MD  2/17/2017

## 2017-02-17 NOTE — PLAN OF CARE
Pt admit w/ STEMI, developed PNA. Pt did not want hospice yesterday, to revisit today. Pt A&O, forgetful at times- very drowsy today, looks exhausted.  Does not want to eat, be repositioned, etc.  Respirations slower today- 3L NC in place to maintain sat

## 2017-02-17 NOTE — PALLIATIVE CARE NOTE
0089 Brennon Castellano Follow Up      [facility] Median  [id number]       Patient seen and evaluated, family (friend) at bedside. Patient c/o fatigue and dysphagia, denies pain. Junctional rate now 30. On O2 per NC, pulse ox 84%.   Butler Peabody

## 2017-02-17 NOTE — PALLIATIVE CARE NOTE
Zaki 31 Work Follow Up      Discussion:  PCSW contacted Shabana Gee to follow-up with the meeting held yesterday regarding hospice.  Julisa/Jimmy state the pts family discussed getting a 24/7 caregiver and that pt

## 2017-02-17 NOTE — PLAN OF CARE
Assumed care. Patient currently resting in bed. C/o some left hip pain. Declined medication but instead, wanted a heating pack. Deciding on hospice tomorrow. IV abx infusing. Will monitor.      RESPIRATORY - ADULT    • Achieves optimal ventilation and oxyge

## 2017-02-17 NOTE — PROGRESS NOTES
[facility] [address] [place] [address] Cardiology Progress Note        Zach Forbes Patient Status:  Inpatient    1915 MRN [id number]   [facility] 8NE-A Attending Bessie Pelletier MD   Hosp Day # 10 PCP Earle Acevedo MD     Subjective: kg)  01/27/17 2320 : 117 lb 1 oz (53.1 kg)  01/27/17 2005 : 118 lb (53.524 kg)           Physical Exam:    Temp:  [97.3 °F (36.3 °C)-97.4 °F (36.3 °C)] 97.4 °F (36.3 °C)  Pulse:  [42-51] 42  Resp:  [16-18] 18  BP: (100-114)/(33-53) 111/41 mmHg    General:

## 2017-02-18 NOTE — PROGRESS NOTES
Pt requesting morphine for pain control today, tramadol given d/t hypotension and bradycardia. Long discussion between patient, pts niece, and HCPOA on hospice. At first, pt hesitant to hospice stating Bonifacio Luong will live with pain rather than die\".  After rosalba

## 2017-02-18 NOTE — PLAN OF CARE
Problem: CARDIOVASCULAR - ADULT  Goal: Maintains optimal cardiac output and hemodynamic stability  INTERVENTIONS:  - Monitor vital signs, rhythm, and trends  - Monitor for bleeding, hypotension and signs of decreased cardiac output  - Evaluate effectivenes pt/family on patient safety including physical limitations  - Instruct pt to call for assistance with activity based on assessment  - Modify environment to reduce risk of injury  - Provide assistive devices as appropriate  - Consider OT/PT consult to yamilet

## 2017-02-18 NOTE — PROGRESS NOTES
[facility]  Progress Note    Jey uBsby Patient Status:  Inpatient    1915 MRN [id number]   [facility] 8NE-A Attending Iza Finney MD   [facility] Day # 6 PCP Barbara Grigsby MD     Renal function worsening; oligo-anuric      Cur 100 mg 100 mg Oral BID   PEG 3350 (MIRALAX) powder packet 17 g 17 g Oral Daily PRN   bisacodyl (DULCOLAX) rectal suppository 10 mg 10 mg Rectal Daily PRN   ondansetron HCl (ZOFRAN) injection 4 mg 4 mg Intravenous Q6H PRN       Physical Exam:  Vital signs:

## 2017-02-18 NOTE — PROGRESS NOTES
[facility] Cardiology Progress Note - Anand Rodriguez Patient Status:  Inpatient    1915 MRN [id number]   [facility] 8NE-A Attending Ching Bennett MD   [facility] Day # 11 PCP Jessica Gee MD     Subjective:  Resting c troponin     Palliative care encounter     Counseling regarding goals of care     NSTEMI (non-ST elevated myocardial infarction) (HCC)      Objective:   Temp: 97.4 °F (36.3 °C)  Pulse: 41  Resp: 20  BP: 109/42 mmHg    Intake/Output:     Intake/Output Summa 3.2*   GLU  159*  103*       No results for input(s): ALT, AST, ALB, AMYLASE, LIPASE, LDH in the last 72 hours. Invalid input(s): ALPHOS, TBIL, DBIL, TPROT    No results for input(s): TROP in the last 72 hours.     Allergies:     Aspirin Q6H PRN   morphINE sulfate (PF) 2 MG/ML injection 1 mg 1 mg Intravenous Q2H PRN   Or      morphINE sulfate (PF) 2 MG/ML injection 2 mg 2 mg Intravenous Q2H PRN   Or      morphINE sulfate (PF) 4 MG/ML injection 4 mg 4 mg Intravenous Q2H PRN   docusate sodiu

## 2017-02-19 NOTE — PLAN OF CARE
Pt alert. Tele-junctional.   Lungs diminished. On 4L/NC. O2 sat 96%. Refusing oral meds. Mouth dry. Good oral care given. Turned q 2 hours. Pain medication for back pain. Plan: CPM           Family to meet with hospice tomorrow.

## 2017-02-19 NOTE — PLAN OF CARE
Pt asked if she needed anything for pain and she still declined. Bladder scanner showed 237cc urine. She could not go on the bedpan and seemed too weak to get OOB to commode. She is still getting NS at 83cc/hr. Renal is following peripherally.

## 2017-02-19 NOTE — PROGRESS NOTES
[facility] hospitalist daily note  Patient was seen/examioned on 2/18/17    S; renal function worse, per patient not urinating.  Renal on the case  Patient denies chest pain, no SOB  Family still has not made a decision as to final goals of care    Medications

## 2017-02-19 NOTE — PROGRESS NOTES
Patient's refusing Kayexalate, reports she has difficulty swallowing. Dr. Aden Laird notified. Potassium redraw ordered and it was 5.5. Patient asked again regarding Kayexalate but continues to refuse. Hospitalist updated.  Physician placed new orders (see o

## 2017-02-19 NOTE — PROGRESS NOTES
[facility] Dosing Service  Antibiotic Dosing    Fede Burger is a 8 year old female for whom pharmacy is dosing Zosyn for treatment of  pneumonia. Allergies: is allergic to aspirin; celebrex; naproxen; ciprofloxacin; and cytotec.     Vitals: BP

## 2017-02-19 NOTE — CM/SW NOTE
sil notified that family wants to meet with [facility] hospice Monday.  sil contacted ben of [facility] and she will reach out to POA to arrange a time for Monday      ADDENDUM:  [facility] hospice to meet with pt/family Monday at 2pm

## 2017-02-20 NOTE — PLAN OF CARE
GENITOURINARY - ADULT    • Absence of urinary retention Not Progressing          CARDIOVASCULAR - ADULT    • Maintains optimal cardiac output and hemodynamic stability Progressing    • Absence of cardiac arrhythmias or at baseline Progressing        PAIN -

## 2017-02-20 NOTE — PALLIATIVE CARE NOTE
Zaki Ramsey Work Follow Up  Discussion:  PCSW contacted pts MAHNAZ/Kwesi who confirmed that the pt and family are meeting today with [facility] at 2pm to discuss \"options\" for the pt.  PCSW asked pts POA/Kwesi wh

## 2017-02-20 NOTE — PROGRESS NOTES
[facility] hospitalist daily note  Patient was seen/examioned on 2/20/17    S; no chest pain, no SOB, feels thirsty, water provided with alleviation of thirst  patient's POA is coming today to meet with [facility] soft, not tender, +BS, + peripehral edema, anne in place with yellow urine, + pulses  Patient will be in inpatient hospice  Charity Mosley MD  [facility] hospitalist  [phone]

## 2017-02-20 NOTE — PALLIATIVE CARE NOTE
1808 Brennon Castellano Follow Up      Sukhjinder Bartholomew  [id number]       Patient seen and evaluated, family (niece and great nephew/HCPOA) at bedside. Family requesting another hospice evaluation with [facility] today.   Creat con orders    Hospital death is expected. An additional 20 minutes of time was spent face to face with the patient and family; >50% was spent counseling and coordinating care.     David Horne, APRN

## 2017-02-20 NOTE — HOSPICE RN NOTE
Inpatient Hospice Admission    Referral received for patient Debbie Rios. Met with family, POA/Son Char Demarco, explanation of Hospice philosophy and Levels of Care. All questions answered to the family's satisfaction and consents were signed.  Rowan Clifton

## 2017-02-20 NOTE — PLAN OF CARE
CARDIOVASCULAR - ADULT    • Maintains optimal cardiac output and hemodynamic stability Progressing    • Absence of cardiac arrhythmias or at baseline Progressing        DISCHARGE PLANNING    • Discharge to home or other facility with appropriate resources HOURS AND PRN. IV ABX'S ( FOR PNEUMONIA). CONTINUE TO MONITOR URINE OUTPUT. CONTINUE FALL PRECAUTIONS. HOSPICE TO SEE IN AM AS ORDERED.

## 2017-02-20 NOTE — PLAN OF CARE
GENITOURINARY - ADULT    • Absence of urinary retention Not Progressing          CARDIOVASCULAR - ADULT    • Maintains optimal cardiac output and hemodynamic stability Progressing    • Absence of cardiac arrhythmias or at baseline Progressing        DISCHA

## 2017-02-20 NOTE — PALLIATIVE CARE NOTE
Zaki [facility] Work Follow Up    Individuals present:  POA/Edson, Pts Niece/Lisa, [facility]/Franchesca, PC APN/Marisol, PCSW    Discussion:  The above individuals met and discussed d/c options extensively, at length in detail.  A

## 2017-02-20 NOTE — PROGRESS NOTES
[facility] hospitalist daily note  Patient was seen/examioned on 2/19/17    S; renal function worse. No complaint of pain.  I spoke with patient's POA, he is interested in meeting with Social work and [facility] hospice

## 2017-02-21 NOTE — DISCHARGE SUMMARY
[facility]  Discharge Summary    Jim Chaudhry Patient Status:  Inpatient    1915 MRN [id number]   [facility] 8NE-A Attending No att. providers found   Hosp Day # 15 PCP Carlos Jacob MD     Date of Admission: 2017    Da given without improvement in renal function. Macias placed   POA decided on Hospice care  No further blood draws    Hyponatremia, Hyperkalemia: renal on the case during admission.  Hyperkalemia improved  Patient transferred to hospice care      Leukocytosis:

## 2017-02-21 NOTE — PROGRESS NOTES
02/20/17 3256   Clinical Encounter Type   Visited With Family   Continue Visiting No   Crisis Visit Death  ( paged to death of patient; offered family spiritual and emotional support and prayer.)

## 2017-02-23 NOTE — DISCHARGE SUMMARY
[facility]  Discharge Summary    Carolin Freire Patient Status:  Inpatient    1915 MRN [id number]   [facility] 8NE-A Attending No att. providers found   Hosp Day # 0 PCP Waldo De Oliveira MD     Date of Admission: 2017    Da

## 2017-02-23 NOTE — H&P
H+P  CC; admit to hospice  HPI: was in the hospital, now admitted to hospice  PMH, PSH, Fam Hx, Social hx, All, meds, see EPIC  ROS reviewed   PE please see discharge summary dated 2/20/17 for exam details while patient was alive  Labs  None on 2/20/17  As

## 2020-03-01 NOTE — PLAN OF CARE
Problem: Impaired Functional Mobility  Goal: Achieve highest/safest level of mobility/gait  Interventions:  - Assess patient’s functional ability and stability  - Promote increasing activity/tolerance for mobility and gait  - Educate and engage patient/fam 1

## 2021-08-27 NOTE — CONSULTS
[address] [place] [address] Patient Status:  Inpatient    1915 MRN [id number]   [facility] 7NE-A Attending David Ta MD   [facility] Day # 1 PCP Antonio Benoit MD     Date of Admission: 2017  Admission Diagnosis: Bilater mouth 2 (two) times daily. (Patient taking differently: Take 500 mg by mouth 2 (two) times daily.  To be completed on 1/28/17 ) Disp: 10 capsule Rfl: 0   DiltiaZEM HCl ER Coated Beads 240 MG Oral Capsule SR 24 Hr Take 1 capsule (240 mg total) by mouth daily drop, 1 drop, Both Eyes, Nightly  •  Fluticasone Propionate (FLONASE) 50 MCG/ACT nasal spray 2 spray, 2 spray, Each Nare, BID  •  amoxicillin (AMOXIL) cap 500 mg, 500 mg, Oral, BID  •  DiltiaZEM HCl ER Coated Beads (CARDIZEM CD) 24 hr cap 240 mg, 240 mg, O edema       Lab Results  Component Value Date   WBC 11.7 01/28/2017   RBC 3.42 01/28/2017   HGB 10.6 01/28/2017   HCT 32.5 01/28/2017   MCV 95.0 01/28/2017   MCH 31.0 01/28/2017   MCHC 32.6 01/28/2017   RDW 13.9 01/28/2017   .0 01/28/2017       Lab Detail Level: Simple Price (Do Not Change): 0.00 Instructions: This plan will send the code FBSE to the PM system.  DO NOT or CHANGE the price.

## 2022-01-12 NOTE — PROGRESS NOTES
[facility] Hospitalist Progress Note                                                                   [address] [facility]  4/11/1915    CC: F/U left shoulder pain    SUBJECTIVE:    Denies any chest multivitamin  1 tablet Oral Daily   • torsemide  20 mg Oral BID (Diuretic)   • docusate sodium  100 mg Oral BID   • metoprolol succinate  25 mg Oral Daily Beta Blocker     Continuous Infusions:      PRN: nitroglycerin, acetaminophen, Albuterol Sulfate HFA, car

## 2022-01-31 NOTE — PROGRESS NOTES
[facility] Hospitalist Progress Note                                                                   [address] [facility]  4/11/1915    CC: BLANKA CP    Interval History:  - worsening renal function  - fatigue.     Pertinent Labs:   Recent Labs   Lab  02/15/17   0405   RBC  2.86*   HGB  8.9*   HCT  27.2*   MCV  95.1   MCH  31.1   MCHC  32.7   RDW  13.0   WBC  19.0*   PLT  381.0     Recent Labs   Lab  02/15/17   0405  02/16/17   0526   GLU  154*  159*   BU degeneration/glaucoma  -continue eye drops    Dispo:      I do think she's actively dying though she's not accepting of this. She has minimal urine outpt. No desire to eat/drink. Bradycardic. Hospice has been recommended multiple times.  Pt does not want to Birth Control Pills Pregnancy And Lactation Text: This medication should be avoided if pregnant and for the first 30 days post-partum.

## 2023-01-23 NOTE — PLAN OF CARE
Please call patient to schedule a virtual visit to discuss starting medications for cholesterol and kidney function.   Christofer Hennessy PA-C  Problem: CARDIOVASCULAR - ADULT  Goal: Maintains optimal cardiac output and hemodynamic stability  INTERVENTIONS:  - Monitor vital signs, rhythm, and trends  - Monitor for bleeding, hypotension and signs of decreased cardiac output  - Evaluate effectivenes applesauce as needed  - Discontinue feeding and notify MD (or speech pathologist) if coughing or persistent throat clearing or wet/gurgly vocal quality is noted   Outcome: Not Progressing    Comments:   Pt received alert in bed denying pain.   Was informed

## (undated) NOTE — IP AVS SNAPSHOT
[facility] [place] [address] [place], [address] ~ [phone]                Discharge Summary   1/27/2017    Salvador Donaldson           Admission Information        Provider Department    1/27/2017 Becky Boxer, MD  7ne-A CONTINUE taking these medications        Instructions Authorizing Provider    Morning Afternoon Evening As Needed    acetaminophen 500 MG Tabs   Last time this was given:  500 mg on 2/2/2017 10:30 AM   Commonly known as:  TYLENOL EXTRA STRENGTH        Take [address] [facility], [phone], [phone]  [address] [address]     Phone:  [phone]    - DiltiaZEM HCl ER Coated Beads 120 MG Cp24  - torsemide 20 MG Tabs              Patient Instructions       Lab work (BMP) o [address] [facility] [address] [phone]              Discharge Orders     Future Labs/Procedures Expected by Expires    BASIC METABOLIC PANEL (8)  3/6/1380 (Approximate) 2/2/2018    Questions:      Spec comment:        Immunization History as of 2/2/2017 133 (01/27/17)  30      Metabolic Lab Results  (Last result in the past 90 days)    ALT Bilirubin,Total Total Protein Albumin Sodium Potassium Chloride    (01/27/17)  24 (01/27/17)  0.3 (01/27/17)  7.4 (01/27/17)  2.7 (L) (02/01/17)  131 (L) (02/01/17)  4. your medications while at home.          Blood Pressure and Cardiac Medications     DiltiaZEM HCl ER Coated Beads 120 MG Oral Capsule SR 24 Hr (Starting on 2/3/2017)         Use: Treat abnormal blood pressure (high or low), cardiac conditions; and/or abnorm mouth/tongue, shortness of breath, sensation of heart racing, rash, or itching           All Other Medications     Multiple Vitamins-Minerals (EYE VITAMINS) Oral Tab    Glucosamine-Chondroitin (GLUCOSAMINE CHONDR COMPLEX) 500-400 MG Oral Cap    Fluocinolon

## (undated) NOTE — IP AVS SNAPSHOT
[facility] [place] [address] [place], [address] ~ [phone]                Discharge Summary   1/19/2017    Ginger Glze           Admission Information        Provider Department    1/19/2017 Katya Cowart MD  7n Next dose due:  1/24 evening        2 sprays by Each Nare route 2 (two) times daily.     Aron Maddie taking these medications        Instructions Authorizing Provider    Morning Afternoon Evening As Needed Metoprolol Succinate ER 25 MG Tb24   Commonly known as:   Toprol XL                Where to Get Your Medications      These medications were sent to [place] [address] [address] [place] [address], [address] [place] [address], [phone], [phone] 31.0 (01/19/17)  33.3  (01/19/17)  225.0     (11/26/16)  15.2 (H) (11/26/16)  3.06 (L) (01/19/17)  10.7 (L) (11/26/16)  28.9 (L) (11/26/16)  94.4 (11/26/16)  31.0 (11/26/16)  32.9  (11/26/16)  444.0       Recent Hematology Lab Results (cont.)  (Last 3 resu - If you have concerns related to behavioral health issues or thoughts of harming yourself, contact [address] [place] [address] at [phone].     - If you don’t have insurance, Adriane Polanco has partnered with [employer] What to report to your healthcare team:  Dizziness, nausea, chest pain, weakness, numbness           Water Pills     torsemide 20 MG Oral Tab       Use: Treat high blood pressure, swelling in legs, too much fluid    Most common side effects: Dizziness, los

## (undated) NOTE — ED AVS SNAPSHOT
[facility] Emergency Department    [place]  [address] [place] [address]    Phone:  [phone]    Fax:  [address]   MRN: [id number]    Department:  [facility] Emergency Department   Date of Visit:  2 IF THERE IS ANY CHANGE OR WORSENING OF YOUR CONDITION, CALL YOUR PRIMARY CARE PHYSICIAN AT ONCE OR RETURN IMMEDIATELY TO THE EMERGENCY DEPARTMENT.     If you have been prescribed any medication(s), please fill your prescription right away and begin taking t

## (undated) NOTE — ED AVS SNAPSHOT
[facility] Emergency Department    [place]  [address] [address]    Phone:  [phone]    Fax:  [address]   MRN: [id number]    Department:  [facility] Emergency Department   Date of Visit:  2 CHANGE how you take these medications     torsemide 20 MG Tabs   Quantity:  60 tablet   Commonly known as:  DEMADEX   Take 1 tablet (20 mg total) by mouth 2 (two) times daily. Start taking Saturday, 2/4/2017 in the afternoon.    Start taking on:  2/4/201 from our patient liason soon after your visit. Also, some patients receive a detailed feedback survey mailed to them a week after the visit. If you receive this, we would really appreciate it if you could take the time to complete it. Thank you!       You [facility] ([address]) [facility] [address] [facility]. ([address]) [facility] [address] ([address]) [address] [facility] [place] [address] ([facility] [address] FINDINGS:  Enlarged cardiac silhouette, unchanged. Aortic atherosclerosis. COPD changes. Small bilateral pleural effusions, mildly improved on the left since 1/31/17. Bibasilar atelectasis again noted. Persistent compression fracture of approximately L2.